# Patient Record
Sex: FEMALE | Race: BLACK OR AFRICAN AMERICAN | NOT HISPANIC OR LATINO | Employment: FULL TIME | URBAN - METROPOLITAN AREA
[De-identification: names, ages, dates, MRNs, and addresses within clinical notes are randomized per-mention and may not be internally consistent; named-entity substitution may affect disease eponyms.]

---

## 2023-09-14 ENCOUNTER — HOSPITAL ENCOUNTER (EMERGENCY)
Facility: HOSPITAL | Age: 53
Discharge: HOME | End: 2023-09-14
Attending: STUDENT IN AN ORGANIZED HEALTH CARE EDUCATION/TRAINING PROGRAM
Payer: COMMERCIAL

## 2023-09-14 VITALS
DIASTOLIC BLOOD PRESSURE: 97 MMHG | WEIGHT: 164 LBS | HEIGHT: 61 IN | SYSTOLIC BLOOD PRESSURE: 143 MMHG | RESPIRATION RATE: 16 BRPM | BODY MASS INDEX: 30.96 KG/M2 | OXYGEN SATURATION: 96 % | TEMPERATURE: 98 F | HEART RATE: 70 BPM

## 2023-09-14 DIAGNOSIS — S60.042A CONTUSION OF LEFT RING FINGER WITHOUT DAMAGE TO NAIL, INITIAL ENCOUNTER: Primary | ICD-10-CM

## 2023-09-14 DIAGNOSIS — S61.215A LACERATION OF LEFT RING FINGER WITHOUT FOREIGN BODY WITHOUT DAMAGE TO NAIL, INITIAL ENCOUNTER: ICD-10-CM

## 2023-09-14 PROCEDURE — 99283 EMERGENCY DEPT VISIT LOW MDM: CPT

## 2023-09-14 NOTE — ED PROVIDER NOTES
Encounter Date: 9/14/2023       History     Chief Complaint   Patient presents with    finger pain     POV, ambulatory, GCS 15. Reports Tuesday night was putting up large box onto shelf and it slipped on her left hand, cutting left distal 4th digit. Reports she has had it wrapped with bandaid and had some bleeding since occurred. No active bleeding at this time. Denies crushing injury.     53 y.o. female presents to the ED with left 4th digit pain secondary to smashing it with metal box at work on Tuesday.  Patient notes she initially had a laceration however states she wrapped it and a Band-Aid and states the bleeding subsided.  States she was upstairs working when they told her to come get the finger evaluated.  Notes pain to the distal edge.    The history is provided by the patient. No  was used.     Review of patient's allergies indicates:  No Known Allergies  No past medical history on file.  No past surgical history on file.  No family history on file.     Review of Systems   Constitutional:  Negative for chills and fever.   Eyes:  Negative for visual disturbance.   Respiratory:  Negative for cough and shortness of breath.    Cardiovascular:  Negative for chest pain.   Gastrointestinal:  Negative for abdominal pain, nausea and vomiting.   Genitourinary:  Negative for dysuria.   Musculoskeletal:  Negative for arthralgias.   Skin:  Positive for wound. Negative for color change and rash.   Neurological:  Negative for dizziness and headaches.   Psychiatric/Behavioral:  Negative for behavioral problems.    All other systems reviewed and are negative.      Physical Exam     Initial Vitals [09/14/23 1615]   BP Pulse Resp Temp SpO2   (!) 143/97 70 16 98 °F (36.7 °C) 96 %      MAP       --         Physical Exam    Nursing note and vitals reviewed.  Constitutional: She appears well-developed and well-nourished.   HENT:   Head: Normocephalic and atraumatic.   Eyes: EOM are normal. Pupils are equal,  round, and reactive to light.   Neck: Neck supple.   Cardiovascular:  Normal rate, regular rhythm and normal heart sounds.           Pulmonary/Chest: Breath sounds normal.   Abdominal: Abdomen is soft. Bowel sounds are normal.   Musculoskeletal:         General: Normal range of motion.        Hands:       Cervical back: Neck supple.     Neurological: She is alert and oriented to person, place, and time. She has normal strength. GCS score is 15. GCS eye subscore is 4. GCS verbal subscore is 5. GCS motor subscore is 6.   Skin: Skin is warm and dry.   Psychiatric: She has a normal mood and affect.         ED Course   Procedures  Labs Reviewed - No data to display       Imaging Results              X-Ray Finger 2 or More Views Left (Final result)  Result time 09/14/23 16:40:04      Final result by Joanna Montanez MD (09/14/23 16:40:04)                   Impression:      No acute bony abnormality.      Electronically signed by: Joanna Montanez  Date:    09/14/2023  Time:    16:40               Narrative:    EXAMINATION:  XR FINGER 2 OR MORE VIEWS LEFT    CLINICAL HISTORY:  laceration;    COMPARISON:  None.    FINDINGS:  There is no acute fracture or malalignment.  The soft tissues are unremarkable.                                       Medications - No data to display  Medical Decision Making  Differential diagnosis:  laceration, contusion, fracture    54 y/o F presents to the ED with left fourth digit laceration and pain s/t injury on Tuesday while at work. Well-approximated laceration to distal fourth digit, mild tenderness. No fracture on XR. Will d/c home with wound care     Amount and/or Complexity of Data Reviewed  Radiology: ordered.                               Clinical Impression:   Final diagnoses:  [S60.042A] Contusion of left ring finger without damage to nail, initial encounter (Primary)  [S61.215A] Laceration of left ring finger without foreign body without damage to nail, initial encounter        ED  Disposition Condition    Discharge Stable          ED Prescriptions    None       Follow-up Information       Follow up With Specialties Details Why Contact Info    [facility] - Emergency Dept Emergency Medicine In 1 week If symptoms worsen [address] [facility] [id number] [phone]    Primary care provider  In 2 days As needed              Koko Lai PA-C  09/14/23 1739

## 2024-02-21 ENCOUNTER — HOSPITAL ENCOUNTER (OUTPATIENT)
Dept: RADIOLOGY | Facility: HOSPITAL | Age: 54
Discharge: HOME | End: 2024-02-21
Attending: NURSE PRACTITIONER
Payer: COMMERCIAL

## 2024-02-21 VITALS — WEIGHT: 180 LBS | BODY MASS INDEX: 33.13 KG/M2 | HEIGHT: 62 IN

## 2024-02-21 DIAGNOSIS — N64.52 NIPPLE DISCHARGE: ICD-10-CM

## 2024-02-22 ENCOUNTER — HOSPITAL ENCOUNTER (OUTPATIENT)
Dept: RADIOLOGY | Facility: HOSPITAL | Age: 54
Discharge: HOME | End: 2024-02-22
Attending: NURSE PRACTITIONER
Payer: COMMERCIAL

## 2024-02-22 DIAGNOSIS — N64.52 NIPPLE DISCHARGE: ICD-10-CM

## 2024-02-22 PROCEDURE — 77066 DX MAMMO INCL CAD BI: CPT | Mod: 26,,, | Performed by: STUDENT IN AN ORGANIZED HEALTH CARE EDUCATION/TRAINING PROGRAM

## 2024-02-22 PROCEDURE — 76642 ULTRASOUND BREAST LIMITED: CPT | Mod: 26,50,, | Performed by: STUDENT IN AN ORGANIZED HEALTH CARE EDUCATION/TRAINING PROGRAM

## 2024-02-22 PROCEDURE — 76642 ULTRASOUND BREAST LIMITED: CPT | Mod: TC,50

## 2024-02-22 PROCEDURE — 77066 DX MAMMO INCL CAD BI: CPT | Mod: TC

## 2024-02-22 PROCEDURE — 77062 BREAST TOMOSYNTHESIS BI: CPT | Mod: 26,,, | Performed by: STUDENT IN AN ORGANIZED HEALTH CARE EDUCATION/TRAINING PROGRAM

## 2024-03-05 ENCOUNTER — HOSPITAL ENCOUNTER (OUTPATIENT)
Dept: RADIOLOGY | Facility: HOSPITAL | Age: 54
Discharge: HOME | End: 2024-03-05
Attending: NURSE PRACTITIONER
Payer: COMMERCIAL

## 2024-03-05 DIAGNOSIS — R92.8 ABNORMAL FINDINGS ON DIAGNOSTIC IMAGING OF BREAST: ICD-10-CM

## 2024-03-05 DIAGNOSIS — R92.8 ABNORMAL FINDINGS ON DIAGNOSTIC IMAGING OF BREAST: Primary | ICD-10-CM

## 2024-03-05 PROCEDURE — 27000550 US BREAST BIOPSY WITH IMAGING 1ST SITE LEFT

## 2024-03-05 PROCEDURE — 19084 BX BREAST ADD LESION US IMAG: CPT | Mod: RT,,, | Performed by: RADIOLOGY

## 2024-03-05 PROCEDURE — 19083 BX BREAST 1ST LESION US IMAG: CPT | Mod: LT,,, | Performed by: RADIOLOGY

## 2024-03-05 PROCEDURE — 19084 BX BREAST ADD LESION US IMAG: CPT

## 2024-03-05 PROCEDURE — 77066 DX MAMMO INCL CAD BI: CPT | Mod: 26,,, | Performed by: RADIOLOGY

## 2024-03-05 PROCEDURE — 77062 BREAST TOMOSYNTHESIS BI: CPT | Mod: 26,,, | Performed by: RADIOLOGY

## 2024-03-05 PROCEDURE — 77066 DX MAMMO INCL CAD BI: CPT | Mod: TC

## 2024-03-08 DIAGNOSIS — D24.1 INTRADUCTAL PAPILLOMA OF BOTH BREASTS: Primary | ICD-10-CM

## 2024-03-08 DIAGNOSIS — D24.2 INTRADUCTAL PAPILLOMA OF BOTH BREASTS: Primary | ICD-10-CM

## 2024-03-08 LAB — PSYCHE PATHOLOGY RESULT: NORMAL

## 2024-03-14 ENCOUNTER — HOSPITAL ENCOUNTER (EMERGENCY)
Facility: HOSPITAL | Age: 54
Discharge: HOME | End: 2024-03-14
Attending: EMERGENCY MEDICINE
Payer: COMMERCIAL

## 2024-03-14 VITALS
HEART RATE: 66 BPM | OXYGEN SATURATION: 96 % | RESPIRATION RATE: 18 BRPM | DIASTOLIC BLOOD PRESSURE: 93 MMHG | BODY MASS INDEX: 29.81 KG/M2 | TEMPERATURE: 98 F | HEIGHT: 62 IN | WEIGHT: 162 LBS | SYSTOLIC BLOOD PRESSURE: 168 MMHG

## 2024-03-14 DIAGNOSIS — S46.912A SHOULDER STRAIN, LEFT, INITIAL ENCOUNTER: Primary | ICD-10-CM

## 2024-03-14 PROCEDURE — 99284 EMERGENCY DEPT VISIT MOD MDM: CPT | Mod: 25

## 2024-03-14 PROCEDURE — 63600175 PHARM REV CODE 636 W HCPCS: Performed by: EMERGENCY MEDICINE

## 2024-03-14 PROCEDURE — 96372 THER/PROPH/DIAG INJ SC/IM: CPT | Performed by: EMERGENCY MEDICINE

## 2024-03-14 RX ORDER — INDOMETHACIN 50 MG/1
50 CAPSULE ORAL
Qty: 15 CAPSULE | Refills: 0 | Status: SHIPPED | OUTPATIENT
Start: 2024-03-14

## 2024-03-14 RX ORDER — DEXAMETHASONE SODIUM PHOSPHATE 4 MG/ML
6 INJECTION, SOLUTION INTRA-ARTICULAR; INTRALESIONAL; INTRAMUSCULAR; INTRAVENOUS; SOFT TISSUE
Status: COMPLETED | OUTPATIENT
Start: 2024-03-14 | End: 2024-03-14

## 2024-03-14 RX ORDER — KETOROLAC TROMETHAMINE 30 MG/ML
60 INJECTION, SOLUTION INTRAMUSCULAR; INTRAVENOUS
Status: COMPLETED | OUTPATIENT
Start: 2024-03-14 | End: 2024-03-14

## 2024-03-14 RX ADMIN — DEXAMETHASONE SODIUM PHOSPHATE 6 MG: 4 INJECTION, SOLUTION INTRA-ARTICULAR; INTRALESIONAL; INTRAMUSCULAR; INTRAVENOUS; SOFT TISSUE at 07:03

## 2024-03-14 RX ADMIN — KETOROLAC TROMETHAMINE 60 MG: 30 INJECTION, SOLUTION INTRAMUSCULAR at 07:03

## 2024-03-14 NOTE — ED TRIAGE NOTES
"Pt c/o L shoulder pain since yesterday. Pt reports she was lifting a heavy piece of equipment over her head and felt a "pop" in her shoulder. States pain worsened this AM. Swelling and tenderness noted to L shoulder. Pt able to perform full ROM.  "

## 2024-03-14 NOTE — ED PROVIDER NOTES
"Encounter Date: 3/14/2024    SCRIBE #1 NOTE: I, Brunilda Beaver, am scribing for, and in the presence of,  Kristofer Gong III, MD. I have scribed the following portions of the note - Other sections scribed: HPI, ROS, PE.       History     Chief Complaint   Patient presents with    Shoulder Pain     Pt c/o L shoulder pain since yesterday. Pt reports she was lifting a heavy piece of equipment over her head and felt a "pop" in her shoulder. States pain worsened this AM. Swelling and tenderness noted to L shoulder. Pt able to perform full ROM.     54 year old female presents to the ED for left shoulder pain and swelling that began on Tuesday 3/12/24 when she was lifting a heavy piece of equipment over her head and felt a "pop." Patient reports that she went to work yesterday and was able to manage, however her pain is worsening. She describes it as a throbbing pain. She denies taking and OTC medication at home. She is right handed. She denies having any allergies.     The history is provided by the patient. No  was used.   Shoulder Pain  This is a new problem. The current episode started 2 days ago. The problem occurs constantly. The problem has been gradually worsening. Pertinent negatives include no chest pain, no abdominal pain, no headaches and no shortness of breath. Exacerbated by: movement. Nothing relieves the symptoms. She has tried nothing for the symptoms.     Review of patient's allergies indicates:  No Known Allergies  No past medical history on file.  No past surgical history on file.  Family History   Problem Relation Age of Onset    Breast cancer Paternal Grandmother 80        Review of Systems   Constitutional:  Negative for chills, fatigue and fever.   HENT:  Negative for congestion and sore throat.    Eyes:  Negative for visual disturbance.   Respiratory:  Negative for cough and shortness of breath.    Cardiovascular:  Negative for chest pain.   Gastrointestinal:  Negative for " abdominal pain, diarrhea, nausea and vomiting.   Genitourinary:  Negative for dysuria.   Musculoskeletal:         Left shoulder pain and swelling    Skin:  Negative for rash.   Neurological:  Negative for weakness, numbness and headaches.   All other systems reviewed and are negative.      Physical Exam     Initial Vitals [03/14/24 0334]   BP Pulse Resp Temp SpO2   (!) 160/90 74 18 97.9 °F (36.6 °C) 97 %      MAP       --         Physical Exam    Nursing note and vitals reviewed.  Constitutional: She appears well-developed and well-nourished. No distress.   HENT:   Head: Normocephalic and atraumatic.   Mouth/Throat: Oropharynx is clear and moist.   Eyes: EOM are normal. Pupils are equal, round, and reactive to light.   Neck: Trachea normal. Neck supple.   Normal range of motion.  Cardiovascular:  Normal rate and regular rhythm.           No murmur heard.  Pulmonary/Chest: Breath sounds normal. No respiratory distress.   Abdominal: Abdomen is soft. Bowel sounds are normal. She exhibits no distension. There is no abdominal tenderness.   Musculoskeletal:         General: Normal range of motion.      Cervical back: Normal range of motion and neck supple.      Lumbar back: Normal range of motion.      Comments: Mild tenderness to the left anterior deltoid, neurovascularly intact      Neurological: She is alert and oriented to person, place, and time. She has normal strength. No cranial nerve deficit. GCS score is 15. GCS eye subscore is 4. GCS verbal subscore is 5. GCS motor subscore is 6.   Skin: Skin is warm and dry. No rash noted.   Psychiatric: She has a normal mood and affect. Her behavior is normal. Judgment and thought content normal.         ED Course   Procedures  Labs Reviewed - No data to display       Imaging Results              X-Ray Shoulder 2 or More Views Left (Final result)  Result time 03/14/24 06:56:34      Final result by George Bautista MD (03/14/24 06:56:34)                   Impression:      No  acute fracture.  Moderate degenerative change with tendinous calcifications noted throughout.      Electronically signed by: George Bautista  Date:    03/14/2024  Time:    06:56               Narrative:    EXAMINATION:  XR SHOULDER COMPLETE 2 OR MORE VIEWS LEFT    CLINICAL HISTORY:  trauma;    TECHNIQUE:  Two or three views of the left shoulder were performed.    COMPARISON:  None    FINDINGS:  Degenerative changes with acromioclavicular osteophytes.  No displaced fracture.  Tendinous calcifications are noted throughout.  There is increase of the acromial humeral interval.                                       Medications   ketorolac injection 60 mg (60 mg Intramuscular Given 3/14/24 0715)   dexAMETHasone injection 6 mg (6 mg Intramuscular Given 3/14/24 0715)     Medical Decision Making  The differential diagnosis includes, but is not limited to, avulsion, fracture, sprain     Patient x-ray without abnormality will start on anti-inflammatories neurologically intact she will follow up with his primary care    Problems Addressed:  Shoulder strain, left, initial encounter: self-limited or minor problem    Amount and/or Complexity of Data Reviewed  Radiology: ordered.    Risk  Prescription drug management.            Scribe Attestation:   Scribe #1: I performed the above scribed service and the documentation accurately describes the services I performed. I attest to the accuracy of the note.    Attending Attestation:           Physician Attestation for Scribe:  Physician Attestation Statement for Scribe #1: I, Kristofer Gong III, MD, reviewed documentation, as scribed by Brunilda Beaver in my presence, and it is both accurate and complete.                                    Clinical Impression:  Final diagnoses:  [S46.912A] Shoulder strain, left, initial encounter (Primary)          ED Disposition Condition    Discharge Stable          ED Prescriptions       Medication Sig Dispense Start Date End Date Auth. Provider     indomethacin (INDOCIN) 50 MG capsule Take 1 capsule (50 mg total) by mouth 3 (three) times daily with meals. 15 capsule 3/14/2024 -- Kristofer Gong III, MD          Follow-up Information       Follow up With Specialties Details Why Contact Info    Bobbi Sierra MD Family Medicine In 3 days  [address]  [facility] [address]  [phone]      Nitin Prince O, DO Orthopedic Surgery In 2 weeks  [address] [facility] [address]  [facility] [address]  [phone]               Kristofer Gong III, MD  03/14/24 0779

## 2024-03-14 NOTE — Clinical Note
"Katherin "Katherinrajni Johnson was seen and treated in our emergency department on 3/14/2024.  She may return with limitations on 03/14/2024.  No lifting over 5 lb with left arm for 3 days     Sincerely,      Kristofer Gong III, MD    "

## 2024-03-25 NOTE — H&P (VIEW-ONLY)
[facility]  Breast Surgical Oncology  New Patient Office Visit - H&P      Referring Provider: Bobbi Sierra   PCP: Bobbi Sierra MD     Chief Complaint:   Chief Complaint   Patient presents with    Biopsy     Patient c/o intermittent bilateral tingling sensation around the nipples x 3 week tender touch, no redness, no nipple discharge         Subjective:       HPI:  Katherin Johnson is a 54 y.o. female who presents on 3/26/2024 for evaluation of  newly diagnosed bilateral intraductal papillomas .     A detailed patient history was obtained and reviewed. She reports right nipple discharge which prompted her imaging work-up. She describes the discharge as yellow-clear. She did not notice any lumps/masses, skin changes, or other findings.    MG breast density: Category B (scattered areas of fibroglandular tissue)     Imagin2024  BL DG MG/BL US BREAST LIMITED at [facility]-  IMPRESSION: BENIGN, ULTRASOUND SUSPICIOUS OF MALIGNANCY   A) R breast 5 o'clock subareolar 0.7 x 0.3 x 0.8 cm oval hypoechoic circumscribed vascular intraductal mass within a mildly dilated ducts correlates with the patient's history of right nipple discharge and is suspicious.  BI-RADS 4: Suspicious.    B) L breast 3 o'clock  2 cm FN 0.6 x 0.4 x 0.5 cm oval nonparallel hypoechoic circumscribed vascular mass was incidentally discovered and is suspicious.  BI-RADS 4: Suspicious.  C) No mammographic evidence of malignancy in either breast.    Pathology:  3/5/2024  Ultrasound-guided Core Needle Biopsy Left Breast 3 o'clock 2 cm FN-  -Sclerosing intraductal papilloma(measuring 3.5 mm) with superimposed organizing hemorrhage  -Focal moderate to florid ductal epithelial hyperplasia of the usual type    3/5/2024  Ultrasound-guided Core Needle Biopsy Right Breast 5 o'clock SA-  -Sclerosing intraductal papilloma (measuring 2.5 mm)  - No evidence of malignancy    OB/GYN History:  Age at Menarche Onset:  16  Menopausal Status: postmenopausal, LMP: No LMP recorded.  Hysterectomy/Oophorectomy: hysterectomy with BSO, at age 46  Hormonal birth control (duration): Yes OCP (estrogen/progesterone).   Pregnancy History:   Age at first live birth: 17  Hormone Replacement Therapy: Yes,  Premarin 0.9 mg (started at age 46 still taking)  Patient did not breast feed.  Patient admits to nipple discharge in right breast.   Patient admits to to previous breast biopsy.( removal of a cyst)  Patient denies to a personal history of breast cancer.    Other Relevant History:  Prior thoracic RT: none  Genetic testing: No  Ashkenazi Evangelical descent: No    Family History:  Family History   Problem Relation Age of Onset    Breast cancer Paternal Grandmother 80    Heart failure Mother         Triple bypass    Heart disease Mother     Cancer Father         Throat cancer        Past History:  Past Medical History:   Diagnosis Date    Anxiety     Insomnia         Past Surgical History:   Procedure Laterality Date     SECTION      I have 4 :  ,,,    COSMETIC SURGERY      Had a tummy tuck    HYSTERECTOMY      Had an hysterectomy in that year    OOPHORECTOMY  2016    When i bailee my hysterectomy    SPINE SURGERY  2012    (Neck)Dr.Jason Mckeon        Social History     Socioeconomic History    Marital status:    Tobacco Use    Smoking status: Never    Smokeless tobacco: Never    Tobacco comments:     N/A   Substance and Sexual Activity    Alcohol use: Never    Drug use: Never    Sexual activity: Not Currently     Partners: Male     Birth control/protection: None        Body mass index is 32.74 kg/m².     Allergy/Medications:   Review of patient's allergies indicates:  No Known Allergies       Current Outpatient Medications:     dextroamphetamine-amphetamine (ADDERALL) 20 mg tablet, Take 1 tablet by mouth 2 (two) times daily., Disp: , Rfl:     ergocalciferol (ERGOCALCIFEROL) 50,000 unit Cap,  "Take 50,000 Units by mouth every 7 days., Disp: , Rfl:     EScitalopram oxalate (LEXAPRO) 20 MG tablet, Take 20 mg by mouth every evening., Disp: , Rfl:     FLUZONE QUAD 9092-7402, PF, 60 mcg (15 mcg x 4)/0.5 mL Syrg, , Disp: , Rfl:     indomethacin (INDOCIN) 50 MG capsule, Take 1 capsule (50 mg total) by mouth 3 (three) times daily with meals., Disp: 15 capsule, Rfl: 0    PREMARIN 0.9 mg Tab, Take 0.9 mg by mouth., Disp: , Rfl:     zolpidem (AMBIEN) 10 mg Tab, Take 10 mg by mouth every evening., Disp: , Rfl:        Review of Systems:  Review of Systems   Constitutional:  Negative for chills, fever, malaise/fatigue and weight loss.   HENT:  Negative for congestion and sore throat.    Eyes:  Negative for blurred vision and double vision.   Respiratory:  Negative for cough, shortness of breath and wheezing.    Cardiovascular:  Negative for chest pain, palpitations and leg swelling.   Gastrointestinal:  Negative for abdominal pain, blood in stool, constipation, diarrhea, heartburn, nausea and vomiting.   Genitourinary:  Negative for dysuria, flank pain, frequency, hematuria and urgency.   Musculoskeletal:  Negative for back pain, joint pain and myalgias.   Skin:  Negative for rash.   Neurological:  Negative for dizziness, tingling, sensory change, weakness and headaches.   Endo/Heme/Allergies:  Does not bruise/bleed easily.   Psychiatric/Behavioral:  Negative for depression. The patient is not nervous/anxious and does not have insomnia.           Objective:     Vitals:  Blood pressure 133/85, pulse 66, temperature 97.8 °F (36.6 °C), temperature source Oral, resp. rate 18, height 5' 2" (1.575 m), weight 81.2 kg (179 lb), SpO2 95 %.      Physical Exam:  General: The patient is awake, alert and oriented times three. The patient is well nourished and in no acute distress.   Neck: There is no evidence of palpable cervical, supraclavicular or axillary adenopathy. The neck is supple. The thyroid is not enlarged. "   Musculoskeletal: The patient has a normal range of motion of her bilateral upper extremities.   Chest: Examination of the chest wall fails to reveal any obvious abnormalities. The lungs are clear to auscultation bilaterally without rales, rhonchi, or wheezing.   Cardiovascular: The heart has a regular rate and rhythm without murmurs, gallops or rubs.  Breast:  Right: Examination of right breast reveals palpable nodule located beneath the NAC which is consistent with biopsy site and likely resolving hematoma. The nipple inverts with palpation and has a small amount of yellow clear discharge. There is no skin dimpling with movement of the pectoralis. There are no significant skin changes overlying the breast.   Left: Examination of the left breast fails to reveal any dominant masses or areas of significant focal nodularity. The nipple inverts with palpation without evidence of discharge. There is no skin dimpling with movement of the pectoralis. There are no significant skin changes overlying the breast.  Abdomen: The abdomen is soft, flat, nontender and nondistended with no palpable masses or organomegaly.  Integumentary: no rashes or skin lesions present  Neurologic: cranial nerves intact, no signs of peripheral neurological deficit, motor/sensory function intact    Assessment and Discussion:       Encounter Diagnoses   Name Primary?    Intraductal papilloma of both breasts Yes    Discharge from right nipple       Intraductal papillomas consist of a monotonous array of papillary cells that grow from the wall of a cyst into its lumen. Although they are not concerning in and of themselves, they can harbor areas of atypia or ductal carcinoma in situ (DCIS). Papillomas can occur as solitary or multiple lesions.    Solitary lesions -- Solitary intraductal papillomas may be identified as a mass on mammography, ultrasound, magnetic resonance imaging (MRI), or ductography, or they can be found incidentally. Nipple  discharge, particularly bloody nipple discharge, is a frequent clinical presentation.     When a core biopsy demonstrates a papilloma with atypical cells, surgical excision is warranted. Papillary lesions with atypia are upgraded pathologically up to 67 percent of the time. After excision, if there is no upgrading beyond atypia, a discussion about endocrine therapy for breast cancer prevention is indicated.    The data surrounding solitary papillomas without evidence of atypia are less clear. Reported rates of upgrade of pure papillary lesions to atypia or malignancy are highly variable, historically ranging from 5 to 20 percent, but trending down to less than 10 percent in the last decade.      The current American Society of Breast Surgeons guidelines suggest individualizing the decision to excise a papilloma based on such criteria as size, symptoms (eg, palpability or nipple discharge), and breast cancer risk factors. Excision is recommended in cases of atypia, a palpable mass lesion, bloody nipple discharge (primarily for symptomatic relief), and/or pathology-imaging discordance, whereas small incidental benign solitary papillomas without atypia and with imaging concordance may be offered close clinical and imaging follow-up.          Plan:     Problem List Items Addressed This Visit          Renal/    Intraductal papilloma of both breasts - Primary    Current Assessment & Plan     Surgery - Bilateral excisional breast biopsy with seed localization  Tentative Date: 4/22/2024  Preop - BMP, CBC  Surgical instruction and information were discussed in detail         Relevant Orders    Case Request Operating Room: EXCISION,MASS,BREAST,USING RADIOLOGICAL MARKER - [facility] Localization - BILATERAL (Completed)    Basic metabolic panel    CBC auto differential    EKG 12-lead    Discharge from right nipple    Relevant Orders    Case Request Operating Room: EXCISION,MASS,BREAST,USING RADIOLOGICAL MARKER - [facility]  Localization - BILATERAL (Completed)    Basic metabolic panel    CBC auto differential    EKG 12-lead             All of questions were answered    Myranda Breaux MD  Breast Surgical Oncology     OFFICE VISIT CODING:    Non-face-to-face time included:  Yes Preparing to see the patient such as reviewing the patient record  Yes Obtaining and reviewing separately obtained history  Yes Independently interpreting results  Yes Documenting clinical information in electronic health record  No Ordering appropriate medications  Yes Ordering appropriate tests  Yes Ordering appropriate procedures (including follow-up)  Yes Referring and communicating with other health care professionals (not separately reported)  Yes Care Coordination (not separately reported)    Face-to-face time included:  Yes Performing a medically necessary appropriate history, examination, and/or evaluation  Yes Communicating results to the patient/family/caregiver  Yes Counseling and educating the patient/family/caregiver  Yes Answering patient/family/caregiver questions    Total Time: 50 minutes    Total time includes both face-to-face and non-face-to-face time personally spent by myself on the day of the visit.

## 2024-03-25 NOTE — PROGRESS NOTES
[facility] - Breast [place] [facility]  Breast Surgical Oncology  New Patient Office Visit - H&P      Referring Provider: Bobbi Sierra   PCP: Bobbi Sierra MD     Chief Complaint:   Chief Complaint   Patient presents with    Biopsy     Patient c/o intermittent bilateral tingling sensation around the nipples x 3 week tender touch, no redness, no nipple discharge         Subjective:       HPI:  Katherin Johnson is a 54 y.o. female who presents on 3/26/2024 for evaluation of  newly diagnosed bilateral intraductal papillomas .     A detailed patient history was obtained and reviewed. She reports right nipple discharge which prompted her imaging work-up. She describes the discharge as yellow-clear. She did not notice any lumps/masses, skin changes, or other findings.    MG breast density: Category B (scattered areas of fibroglandular tissue)     [id number]  BL DG MG/BL US BREAST LIMITED at [facility]-  IMPRESSION: BENIGN, ULTRASOUND SUSPICIOUS OF MALIGNANCY   A) R breast 5 o'clock subareolar 0.7 x 0.3 x 0.8 cm oval hypoechoic circumscribed vascular intraductal mass within a mildly dilated ducts correlates with the patient's history of right nipple discharge and is suspicious.  BI-RADS 4: Suspicious.    B) L breast 3 o'clock  2 cm FN 0.6 x 0.4 x 0.5 cm oval nonparallel hypoechoic circumscribed vascular mass was incidentally discovered and is suspicious.  BI-RADS 4: Suspicious.  C) No mammographic evidence of malignancy in either breast.    Pathology:  3/5/2024  Ultrasound-guided Core Needle Biopsy Left Breast 3 o'clock 2 cm FN-  -Sclerosing intraductal papilloma(measuring 3.5 mm) with superimposed organizing hemorrhage  -Focal moderate to florid ductal epithelial hyperplasia of the usual type    3/5/2024  Ultrasound-guided Core Needle Biopsy Right Breast 5 o'clock SA-  -Sclerosing intraductal papilloma (measuring 2.5 mm)  - No evidence of malignancy    OB/GYN History:  Age at Menarche Onset:  16  Menopausal Status: postmenopausal, LMP: No LMP recorded.  Hysterectomy/Oophorectomy: hysterectomy with BSO, at age 46  Hormonal birth control (duration): Yes OCP (estrogen/progesterone).   Pregnancy History:   Age at first live birth: 17  Hormone Replacement Therapy: Yes,  Premarin 0.9 mg (started at age 46 still taking)  Patient did not breast feed.  Patient admits to nipple discharge in right breast.   Patient admits to to previous breast biopsy.( removal of a cyst)  Patient denies to a personal history of breast cancer.    Other Relevant History:  Prior thoracic RT: none  Genetic testing: No  Ashkenazi Episcopal descent: No    Family History:  Family History   Problem Relation Age of Onset    Breast cancer Paternal Grandmother 80    Heart failure Mother         Triple bypass    Heart disease Mother     Cancer Father         Throat cancer        Past History:  Past Medical History:   Diagnosis Date    Anxiety     Insomnia         Past Surgical History:   Procedure Laterality Date     SECTION      I have 4 :  ,,,    COSMETIC SURGERY      Had a tummy tuck    HYSTERECTOMY      Had an hysterectomy in that year    OOPHORECTOMY  2016    When i bailee my hysterectomy    SPINE SURGERY  2012    (Neck)Dr.Jason Mckeon        Social History     Socioeconomic History    Marital status:    Tobacco Use    Smoking status: Never    Smokeless tobacco: Never    Tobacco comments:     N/A   Substance and Sexual Activity    Alcohol use: Never    Drug use: Never    Sexual activity: Not Currently     Partners: Male     Birth control/protection: None        Body mass index is 32.74 kg/m².     Allergy/Medications:   Review of patient's allergies indicates:  No Known Allergies       Current Outpatient Medications:     dextroamphetamine-amphetamine (ADDERALL) 20 mg tablet, Take 1 tablet by mouth 2 (two) times daily., Disp: , Rfl:     ergocalciferol (ERGOCALCIFEROL) 50,000 unit Cap,  "Take 50,000 Units by mouth every 7 days., Disp: , Rfl:     EScitalopram oxalate (LEXAPRO) 20 MG tablet, Take 20 mg by mouth every evening., Disp: , Rfl:     FLUZONE QUAD 7070-9872, PF, 60 mcg (15 mcg x 4)/0.5 mL Syrg, , Disp: , Rfl:     indomethacin (INDOCIN) 50 MG capsule, Take 1 capsule (50 mg total) by mouth 3 (three) times daily with meals., Disp: 15 capsule, Rfl: 0    PREMARIN 0.9 mg Tab, Take 0.9 mg by mouth., Disp: , Rfl:     zolpidem (AMBIEN) 10 mg Tab, Take 10 mg by mouth every evening., Disp: , Rfl:        Review of Systems:  Review of Systems   Constitutional:  Negative for chills, fever, malaise/fatigue and weight loss.   HENT:  Negative for congestion and sore throat.    Eyes:  Negative for blurred vision and double vision.   Respiratory:  Negative for cough, shortness of breath and wheezing.    Cardiovascular:  Negative for chest pain, palpitations and leg swelling.   Gastrointestinal:  Negative for abdominal pain, blood in stool, constipation, diarrhea, heartburn, nausea and vomiting.   Genitourinary:  Negative for dysuria, flank pain, frequency, hematuria and urgency.   Musculoskeletal:  Negative for back pain, joint pain and myalgias.   Skin:  Negative for rash.   Neurological:  Negative for dizziness, tingling, sensory change, weakness and headaches.   Endo/Heme/Allergies:  Does not bruise/bleed easily.   Psychiatric/Behavioral:  Negative for depression. The patient is not nervous/anxious and does not have insomnia.           Objective:     Vitals:  Blood pressure 133/85, pulse 66, temperature 97.8 °F (36.6 °C), temperature source Oral, resp. rate 18, height 5' 2" (1.575 m), weight 81.2 kg (179 lb), SpO2 95 %.      Physical Exam:  General: The patient is awake, alert and oriented times three. The patient is well nourished and in no acute distress.   Neck: There is no evidence of palpable cervical, supraclavicular or axillary adenopathy. The neck is supple. The thyroid is not enlarged. "   Musculoskeletal: The patient has a normal range of motion of her bilateral upper extremities.   Chest: Examination of the chest wall fails to reveal any obvious abnormalities. The lungs are clear to auscultation bilaterally without rales, rhonchi, or wheezing.   Cardiovascular: The heart has a regular rate and rhythm without murmurs, gallops or rubs.  Breast:  Right: Examination of right breast reveals palpable nodule located beneath the NAC which is consistent with biopsy site and likely resolving hematoma. The nipple inverts with palpation and has a small amount of yellow clear discharge. There is no skin dimpling with movement of the pectoralis. There are no significant skin changes overlying the breast.   Left: Examination of the left breast fails to reveal any dominant masses or areas of significant focal nodularity. The nipple inverts with palpation without evidence of discharge. There is no skin dimpling with movement of the pectoralis. There are no significant skin changes overlying the breast.  Abdomen: The abdomen is soft, flat, nontender and nondistended with no palpable masses or organomegaly.  Integumentary: no rashes or skin lesions present  Neurologic: cranial nerves intact, no signs of peripheral neurological deficit, motor/sensory function intact    Assessment and Discussion:       Encounter Diagnoses   Name Primary?    Intraductal papilloma of both breasts Yes    Discharge from right nipple       Intraductal papillomas consist of a monotonous array of papillary cells that grow from the wall of a cyst into its lumen. Although they are not concerning in and of themselves, they can harbor areas of atypia or ductal carcinoma in situ (DCIS). Papillomas can occur as solitary or multiple lesions.    Solitary lesions -- Solitary intraductal papillomas may be identified as a mass on mammography, ultrasound, magnetic resonance imaging (MRI), or ductography, or they can be found incidentally. Nipple  discharge, particularly bloody nipple discharge, is a frequent clinical presentation.     When a core biopsy demonstrates a papilloma with atypical cells, surgical excision is warranted. Papillary lesions with atypia are upgraded pathologically up to 67 percent of the time. After excision, if there is no upgrading beyond atypia, a discussion about endocrine therapy for breast cancer prevention is indicated.    The data surrounding solitary papillomas without evidence of atypia are less clear. Reported rates of upgrade of pure papillary lesions to atypia or malignancy are highly variable, historically ranging from 5 to 20 percent, but trending down to less than 10 percent in the last decade.      The current American Society of Breast Surgeons guidelines suggest individualizing the decision to excise a papilloma based on such criteria as size, symptoms (eg, palpability or nipple discharge), and breast cancer risk factors. Excision is recommended in cases of atypia, a palpable mass lesion, bloody nipple discharge (primarily for symptomatic relief), and/or pathology-imaging discordance, whereas small incidental benign solitary papillomas without atypia and with imaging concordance may be offered close clinical and imaging follow-up.          Plan:     Problem List Items Addressed This Visit          Renal/    Intraductal papilloma of both breasts - Primary    Current Assessment & Plan     Surgery - Bilateral excisional breast biopsy with seed localization  Tentative Date: 4/22/2024  Preop - BMP, CBC  Surgical instruction and information were discussed in detail         Relevant Orders    Case Request Operating Room: EXCISION,MASS,BREAST,USING RADIOLOGICAL MARKER - Magseed Localization - BILATERAL (Completed)    Basic metabolic panel    CBC auto differential    EKG 12-lead    Discharge from right nipple    Relevant Orders    Case Request Operating Room: EXCISION,MASS,BREAST,USING RADIOLOGICAL MARKER - Magseed  Localization - BILATERAL (Completed)    Basic metabolic panel    CBC auto differential    EKG 12-lead             All of questions were answered    Myranda Breaux MD  Breast Surgical Oncology     OFFICE VISIT CODING:    Non-face-to-face time included:  Yes Preparing to see the patient such as reviewing the patient record  Yes Obtaining and reviewing separately obtained history  Yes Independently interpreting results  Yes Documenting clinical information in electronic health record  No Ordering appropriate medications  Yes Ordering appropriate tests  Yes Ordering appropriate procedures (including follow-up)  Yes Referring and communicating with other health care professionals (not separately reported)  Yes Care Coordination (not separately reported)    Face-to-face time included:  Yes Performing a medically necessary appropriate history, examination, and/or evaluation  Yes Communicating results to the patient/family/caregiver  Yes Counseling and educating the patient/family/caregiver  Yes Answering patient/family/caregiver questions    Total Time: 50 minutes    Total time includes both face-to-face and non-face-to-face time personally spent by myself on the day of the visit.

## 2024-03-26 ENCOUNTER — OFFICE VISIT (OUTPATIENT)
Dept: SURGERY | Facility: CLINIC | Age: 54
End: 2024-03-26
Payer: COMMERCIAL

## 2024-03-26 VITALS
BODY MASS INDEX: 32.94 KG/M2 | DIASTOLIC BLOOD PRESSURE: 85 MMHG | OXYGEN SATURATION: 95 % | WEIGHT: 179 LBS | RESPIRATION RATE: 18 BRPM | TEMPERATURE: 98 F | SYSTOLIC BLOOD PRESSURE: 133 MMHG | HEIGHT: 62 IN | HEART RATE: 66 BPM

## 2024-03-26 DIAGNOSIS — N64.52 DISCHARGE FROM RIGHT NIPPLE: ICD-10-CM

## 2024-03-26 DIAGNOSIS — D24.1 INTRADUCTAL PAPILLOMA OF BOTH BREASTS: Primary | ICD-10-CM

## 2024-03-26 DIAGNOSIS — D24.2 INTRADUCTAL PAPILLOMA OF BOTH BREASTS: Primary | ICD-10-CM

## 2024-03-26 PROCEDURE — 3079F DIAST BP 80-89 MM HG: CPT | Mod: CPTII,S$GLB,, | Performed by: SURGERY

## 2024-03-26 PROCEDURE — 1160F RVW MEDS BY RX/DR IN RCRD: CPT | Mod: CPTII,S$GLB,, | Performed by: SURGERY

## 2024-03-26 PROCEDURE — 99999 PR PBB SHADOW E&M-EST. PATIENT-LVL V: CPT | Mod: PBBFAC,,, | Performed by: SURGERY

## 2024-03-26 PROCEDURE — 3008F BODY MASS INDEX DOCD: CPT | Mod: CPTII,S$GLB,, | Performed by: SURGERY

## 2024-03-26 PROCEDURE — 1159F MED LIST DOCD IN RCRD: CPT | Mod: CPTII,S$GLB,, | Performed by: SURGERY

## 2024-03-26 PROCEDURE — 99204 OFFICE O/P NEW MOD 45 MIN: CPT | Mod: S$GLB,,, | Performed by: SURGERY

## 2024-03-26 PROCEDURE — 3075F SYST BP GE 130 - 139MM HG: CPT | Mod: CPTII,S$GLB,, | Performed by: SURGERY

## 2024-03-26 RX ORDER — ESTROGENS, CONJUGATED 0.9 MG/1
0.9 TABLET, FILM COATED ORAL DAILY
Status: ON HOLD | COMMUNITY
Start: 2024-03-20 | End: 2024-04-22 | Stop reason: HOSPADM

## 2024-03-26 RX ORDER — ZOLPIDEM TARTRATE 10 MG/1
10 TABLET ORAL NIGHTLY
COMMUNITY
Start: 2024-03-20

## 2024-03-26 RX ORDER — ERGOCALCIFEROL 1.25 MG/1
50000 CAPSULE ORAL
COMMUNITY
Start: 2024-01-12 | End: 2024-05-28

## 2024-03-26 RX ORDER — ESCITALOPRAM OXALATE 20 MG/1
20 TABLET ORAL NIGHTLY
COMMUNITY
Start: 2024-03-20

## 2024-03-26 RX ORDER — INFLUENZA A VIRUS A/VICTORIA/4897/2022 IVR-238 (H1N1) ANTIGEN (FORMALDEHYDE INACTIVATED), INFLUENZA A VIRUS A/DARWIN/9/2021 SAN-010 (H3N2) ANTIGEN (FORMALDEHYDE INACTIVATED), INFLUENZA B VIRUS B/PHUKET/3073/2013 ANTIGEN (FORMALDEHYDE INACTIVATED), AND INFLUENZA B VIRUS B/MICHIGAN/01/2021 ANTIGEN (FORMALDEHYDE INACTIVATED) 15; 15; 15; 15 UG/.5ML; UG/.5ML; UG/.5ML; UG/.5ML
INJECTION, SUSPENSION INTRAMUSCULAR
Status: ON HOLD | COMMUNITY
Start: 2023-12-01 | End: 2024-04-22

## 2024-03-26 RX ORDER — DEXTROAMPHETAMINE SACCHARATE, AMPHETAMINE ASPARTATE, DEXTROAMPHETAMINE SULFATE AND AMPHETAMINE SULFATE 5; 5; 5; 5 MG/1; MG/1; MG/1; MG/1
1 TABLET ORAL 2 TIMES DAILY
COMMUNITY
Start: 2024-03-14

## 2024-03-26 NOTE — ASSESSMENT & PLAN NOTE
Surgery - Bilateral excisional breast biopsy with seed localization  Tentative Date: 4/22/2024  Preop - BMP, CBC  Surgical instruction and information were discussed in detail

## 2024-03-28 PROBLEM — N64.52 DISCHARGE FROM RIGHT NIPPLE: Status: ACTIVE | Noted: 2024-03-28

## 2024-03-28 RX ORDER — SODIUM CHLORIDE, SODIUM LACTATE, POTASSIUM CHLORIDE, CALCIUM CHLORIDE 600; 310; 30; 20 MG/100ML; MG/100ML; MG/100ML; MG/100ML
INJECTION, SOLUTION INTRAVENOUS CONTINUOUS
Status: CANCELLED | OUTPATIENT
Start: 2024-03-28

## 2024-04-17 ENCOUNTER — LAB VISIT (OUTPATIENT)
Dept: LAB | Facility: HOSPITAL | Age: 54
End: 2024-04-17
Attending: SURGERY
Payer: COMMERCIAL

## 2024-04-17 ENCOUNTER — HOSPITAL ENCOUNTER (OUTPATIENT)
Dept: RADIOLOGY | Facility: HOSPITAL | Age: 54
Discharge: HOME | End: 2024-04-17
Attending: SURGERY
Payer: COMMERCIAL

## 2024-04-17 DIAGNOSIS — D24.2 INTRADUCTAL PAPILLOMA OF BREAST, LEFT: ICD-10-CM

## 2024-04-17 DIAGNOSIS — D24.1 INTRADUCTAL PAPILLOMA OF BOTH BREASTS: ICD-10-CM

## 2024-04-17 DIAGNOSIS — D24.1 INTRADUCTAL PAPILLOMA OF BREAST, RIGHT: ICD-10-CM

## 2024-04-17 DIAGNOSIS — N64.52 DISCHARGE FROM RIGHT NIPPLE: ICD-10-CM

## 2024-04-17 DIAGNOSIS — D24.2 INTRADUCTAL PAPILLOMA OF BOTH BREASTS: ICD-10-CM

## 2024-04-17 LAB
ANION GAP SERPL CALC-SCNC: 9 MEQ/L
BASOPHILS # BLD AUTO: 0.04 X10(3)/MCL
BASOPHILS NFR BLD AUTO: 0.7 %
BUN SERPL-MCNC: 11.4 MG/DL (ref 9.8–20.1)
CALCIUM SERPL-MCNC: 8.4 MG/DL (ref 8.4–10.2)
CHLORIDE SERPL-SCNC: 105 MMOL/L (ref 98–107)
CO2 SERPL-SCNC: 24 MMOL/L (ref 22–29)
CREAT SERPL-MCNC: 0.76 MG/DL (ref 0.55–1.02)
CREAT/UREA NIT SERPL: 15
EOSINOPHIL # BLD AUTO: 0.12 X10(3)/MCL (ref 0–0.9)
EOSINOPHIL NFR BLD AUTO: 2.1 %
ERYTHROCYTE [DISTWIDTH] IN BLOOD BY AUTOMATED COUNT: 13.3 % (ref 11.5–17)
GFR SERPLBLD CREATININE-BSD FMLA CKD-EPI: >60 MLS/MIN/1.73/M2
GLUCOSE SERPL-MCNC: 125 MG/DL (ref 74–100)
HCT VFR BLD AUTO: 42.1 % (ref 37–47)
HGB BLD-MCNC: 13.6 G/DL (ref 12–16)
IMM GRANULOCYTES # BLD AUTO: 0.01 X10(3)/MCL (ref 0–0.04)
IMM GRANULOCYTES NFR BLD AUTO: 0.2 %
LYMPHOCYTES # BLD AUTO: 2.14 X10(3)/MCL (ref 0.6–4.6)
LYMPHOCYTES NFR BLD AUTO: 37.3 %
MCH RBC QN AUTO: 28.2 PG (ref 27–31)
MCHC RBC AUTO-ENTMCNC: 32.3 G/DL (ref 33–36)
MCV RBC AUTO: 87.2 FL (ref 80–94)
MONOCYTES # BLD AUTO: 0.53 X10(3)/MCL (ref 0.1–1.3)
MONOCYTES NFR BLD AUTO: 9.2 %
NEUTROPHILS # BLD AUTO: 2.9 X10(3)/MCL (ref 2.1–9.2)
NEUTROPHILS NFR BLD AUTO: 50.5 %
NRBC BLD AUTO-RTO: 0 %
OHS QRS DURATION: 80 MS
OHS QTC CALCULATION: 471 MS
PLATELET # BLD AUTO: 230 X10(3)/MCL (ref 130–400)
PMV BLD AUTO: 10.3 FL (ref 7.4–10.4)
POTASSIUM SERPL-SCNC: 3.6 MMOL/L (ref 3.5–5.1)
RBC # BLD AUTO: 4.83 X10(6)/MCL (ref 4.2–5.4)
SODIUM SERPL-SCNC: 138 MMOL/L (ref 136–145)
WBC # SPEC AUTO: 5.74 X10(3)/MCL (ref 4.5–11.5)

## 2024-04-17 PROCEDURE — 19286 PERQ DEV BREAST ADD US IMAG: CPT | Mod: ,,, | Performed by: RADIOLOGY

## 2024-04-17 PROCEDURE — 77062 BREAST TOMOSYNTHESIS BI: CPT | Mod: TC

## 2024-04-17 PROCEDURE — 36415 COLL VENOUS BLD VENIPUNCTURE: CPT

## 2024-04-17 PROCEDURE — 19285 PERQ DEV BREAST 1ST US IMAG: CPT | Mod: LT

## 2024-04-17 PROCEDURE — 77062 BREAST TOMOSYNTHESIS BI: CPT | Mod: 26,,, | Performed by: RADIOLOGY

## 2024-04-17 PROCEDURE — 77066 DX MAMMO INCL CAD BI: CPT | Mod: 26,,, | Performed by: RADIOLOGY

## 2024-04-17 PROCEDURE — 93005 ELECTROCARDIOGRAM TRACING: CPT

## 2024-04-17 PROCEDURE — 19285 PERQ DEV BREAST 1ST US IMAG: CPT | Mod: LT,,, | Performed by: RADIOLOGY

## 2024-04-17 PROCEDURE — A4648 IMPLANTABLE TISSUE MARKER: HCPCS

## 2024-04-19 ENCOUNTER — ANESTHESIA EVENT (OUTPATIENT)
Dept: SURGERY | Facility: HOSPITAL | Age: 54
End: 2024-04-19
Payer: COMMERCIAL

## 2024-04-19 RX ORDER — DROPERIDOL 2.5 MG/ML
0.25 INJECTION, SOLUTION INTRAMUSCULAR; INTRAVENOUS ONCE AS NEEDED
Status: CANCELLED | OUTPATIENT
Start: 2024-04-19 | End: 2035-09-16

## 2024-04-19 NOTE — ANESTHESIA PREPROCEDURE EVALUATION
04/19/2024  Katherin Johnson is a 54 y.o., female with bilateral breast CA for mass excision and SNB.      Pre-op Assessment    I have reviewed the Patient Summary Reports.     I have reviewed the Nursing Notes. I have reviewed the NPO Status.   I have reviewed the Medications.     Review of Systems  Anesthesia Hx:  No problems with previous Anesthesia             Denies Family Hx of Anesthesia complications.    Denies Personal Hx of Anesthesia complications.                    Hematology/Oncology:  Hematology Normal   Oncology Normal                                   EENT/Dental:  EENT/Dental Normal    Ears General/Symptom(s)    Ear Disease:  Tympanic Membrane Problem        Cardiovascular:  Cardiovascular Normal                                            Pulmonary:  Pulmonary Normal                       Renal/:  Renal/ Normal                 Hepatic/GI:  Hepatic/GI Normal                 Musculoskeletal:  Musculoskeletal Normal                Neurological:  Neurology Normal                                      Endocrine:  Endocrine Normal            Dermatological:  Skin Normal    Psych:  Psychiatric Normal                    Physical Exam  General: Well nourished    Airway:  Mallampati: I   Mouth Opening: Normal  TM Distance: Normal  Tongue: Normal  Neck ROM: Normal ROM    Dental:  Intact, Caps / Implants    Chest/Lungs:  Clear to auscultation, Normal Respiratory Rate    Heart:  Rate: Normal  Rhythm: Regular Rhythm        Anesthesia Plan  Type of Anesthesia, risks & benefits discussed:    Anesthesia Type: Gen Supraglottic Airway  Intra-op Monitoring Plan: Standard ASA Monitors  Post Op Pain Control Plan: multimodal analgesia and IV/PO Opioids PRN  Induction:  IV  Informed Consent: Informed consent signed with the Patient and all parties understand the risks and agree with anesthesia plan.  All questions  answered.   ASA Score: 3  Day of Surgery Review of History & Physical: H&P Update referred to the surgeon/provider.I have interviewed and examined the patient. I have reviewed the patient's H&P dated: There are no significant changes. H&P completed by Anesthesiologist.    Ready For Surgery From Anesthesia Perspective.     .

## 2024-04-22 ENCOUNTER — HOSPITAL ENCOUNTER (OUTPATIENT)
Dept: RADIOLOGY | Facility: HOSPITAL | Age: 54
Discharge: HOME | End: 2024-04-22
Attending: SURGERY | Admitting: SURGERY
Payer: COMMERCIAL

## 2024-04-22 ENCOUNTER — ANESTHESIA (OUTPATIENT)
Dept: SURGERY | Facility: HOSPITAL | Age: 54
End: 2024-04-22
Payer: COMMERCIAL

## 2024-04-22 ENCOUNTER — HOSPITAL ENCOUNTER (OUTPATIENT)
Facility: HOSPITAL | Age: 54
Discharge: HOME | End: 2024-04-22
Attending: SURGERY | Admitting: SURGERY
Payer: COMMERCIAL

## 2024-04-22 DIAGNOSIS — Z98.890 S/P LUMPECTOMY OF BREAST: Primary | ICD-10-CM

## 2024-04-22 DIAGNOSIS — R92.8 ABNORMAL MAMMOGRAM: ICD-10-CM

## 2024-04-22 DIAGNOSIS — D24.1 INTRADUCTAL PAPILLOMA OF BOTH BREASTS: ICD-10-CM

## 2024-04-22 DIAGNOSIS — N64.52 DISCHARGE FROM RIGHT NIPPLE: ICD-10-CM

## 2024-04-22 DIAGNOSIS — D24.2 INTRADUCTAL PAPILLOMA OF BOTH BREASTS: ICD-10-CM

## 2024-04-22 PROCEDURE — 71000033 HC RECOVERY, INTIAL HOUR: Performed by: SURGERY

## 2024-04-22 PROCEDURE — 76098 X-RAY EXAM SURGICAL SPECIMEN: CPT | Mod: TC

## 2024-04-22 PROCEDURE — 76098 X-RAY EXAM SURGICAL SPECIMEN: CPT | Mod: 26,RT,, | Performed by: RADIOLOGY

## 2024-04-22 PROCEDURE — 63600175 PHARM REV CODE 636 W HCPCS: Performed by: NURSE ANESTHETIST, CERTIFIED REGISTERED

## 2024-04-22 PROCEDURE — 63600175 PHARM REV CODE 636 W HCPCS: Performed by: ANESTHESIOLOGY

## 2024-04-22 PROCEDURE — 71000016 HC POSTOP RECOV ADDL HR: Performed by: SURGERY

## 2024-04-22 PROCEDURE — D9220A PRA ANESTHESIA: Mod: ANES,,, | Performed by: ANESTHESIOLOGY

## 2024-04-22 PROCEDURE — 37000009 HC ANESTHESIA EA ADD 15 MINS: Performed by: SURGERY

## 2024-04-22 PROCEDURE — 71000015 HC POSTOP RECOV 1ST HR: Performed by: SURGERY

## 2024-04-22 PROCEDURE — 36000707: Performed by: SURGERY

## 2024-04-22 PROCEDURE — 19125 EXCISION BREAST LESION: CPT | Mod: 50,,, | Performed by: SURGERY

## 2024-04-22 PROCEDURE — 63600175 PHARM REV CODE 636 W HCPCS: Performed by: SURGERY

## 2024-04-22 PROCEDURE — 25000003 PHARM REV CODE 250: Performed by: ANESTHESIOLOGY

## 2024-04-22 PROCEDURE — 37000008 HC ANESTHESIA 1ST 15 MINUTES: Performed by: SURGERY

## 2024-04-22 PROCEDURE — 76098 X-RAY EXAM SURGICAL SPECIMEN: CPT | Mod: 26,LT,, | Performed by: RADIOLOGY

## 2024-04-22 PROCEDURE — 63600175 PHARM REV CODE 636 W HCPCS: Mod: JZ,JG | Performed by: SURGERY

## 2024-04-22 PROCEDURE — 25000003 PHARM REV CODE 250: Performed by: NURSE ANESTHETIST, CERTIFIED REGISTERED

## 2024-04-22 PROCEDURE — 27201423 OPTIME MED/SURG SUP & DEVICES STERILE SUPPLY: Performed by: SURGERY

## 2024-04-22 PROCEDURE — D9220A PRA ANESTHESIA: Mod: CRNA,,, | Performed by: NURSE ANESTHETIST, CERTIFIED REGISTERED

## 2024-04-22 PROCEDURE — 36000706: Performed by: SURGERY

## 2024-04-22 RX ORDER — DIPHENHYDRAMINE HYDROCHLORIDE 50 MG/ML
6.25 INJECTION INTRAMUSCULAR; INTRAVENOUS ONCE AS NEEDED
Status: DISCONTINUED | OUTPATIENT
Start: 2024-04-22 | End: 2024-04-22 | Stop reason: HOSPADM

## 2024-04-22 RX ORDER — ONDANSETRON HYDROCHLORIDE 2 MG/ML
INJECTION, SOLUTION INTRAVENOUS
Status: DISCONTINUED | OUTPATIENT
Start: 2024-04-22 | End: 2024-04-22

## 2024-04-22 RX ORDER — OXYCODONE HYDROCHLORIDE 5 MG/1
5 TABLET ORAL
Status: DISCONTINUED | OUTPATIENT
Start: 2024-04-22 | End: 2024-04-22 | Stop reason: HOSPADM

## 2024-04-22 RX ORDER — GABAPENTIN 300 MG/1
300 CAPSULE ORAL
Status: COMPLETED | OUTPATIENT
Start: 2024-04-22 | End: 2024-04-22

## 2024-04-22 RX ORDER — GLYCOPYRROLATE 0.2 MG/ML
INJECTION INTRAMUSCULAR; INTRAVENOUS
Status: DISCONTINUED | OUTPATIENT
Start: 2024-04-22 | End: 2024-04-22

## 2024-04-22 RX ORDER — FENTANYL CITRATE 50 UG/ML
INJECTION, SOLUTION INTRAMUSCULAR; INTRAVENOUS
Status: DISCONTINUED | OUTPATIENT
Start: 2024-04-22 | End: 2024-04-22

## 2024-04-22 RX ORDER — TRAMADOL HYDROCHLORIDE 50 MG/1
50 TABLET ORAL EVERY 4 HOURS PRN
Status: DISCONTINUED | OUTPATIENT
Start: 2024-04-22 | End: 2024-04-22 | Stop reason: HOSPADM

## 2024-04-22 RX ORDER — HYDROMORPHONE HYDROCHLORIDE 2 MG/ML
0.4 INJECTION, SOLUTION INTRAMUSCULAR; INTRAVENOUS; SUBCUTANEOUS EVERY 5 MIN PRN
Status: DISCONTINUED | OUTPATIENT
Start: 2024-04-22 | End: 2024-04-22 | Stop reason: HOSPADM

## 2024-04-22 RX ORDER — ACETAMINOPHEN 500 MG
1000 TABLET ORAL
Status: COMPLETED | OUTPATIENT
Start: 2024-04-22 | End: 2024-04-22

## 2024-04-22 RX ORDER — LIDOCAINE HYDROCHLORIDE 20 MG/ML
INJECTION INTRAVENOUS
Status: DISCONTINUED | OUTPATIENT
Start: 2024-04-22 | End: 2024-04-22

## 2024-04-22 RX ORDER — TRAMADOL HYDROCHLORIDE 50 MG/1
50 TABLET ORAL EVERY 6 HOURS PRN
Qty: 28 TABLET | Refills: 0 | Status: SHIPPED | OUTPATIENT
Start: 2024-04-22 | End: 2024-04-29

## 2024-04-22 RX ORDER — KETOROLAC TROMETHAMINE 30 MG/ML
30 INJECTION, SOLUTION INTRAMUSCULAR; INTRAVENOUS ONCE
Status: COMPLETED | OUTPATIENT
Start: 2024-04-22 | End: 2024-04-22

## 2024-04-22 RX ORDER — SODIUM CHLORIDE, SODIUM LACTATE, POTASSIUM CHLORIDE, CALCIUM CHLORIDE 600; 310; 30; 20 MG/100ML; MG/100ML; MG/100ML; MG/100ML
INJECTION, SOLUTION INTRAVENOUS CONTINUOUS
Status: DISCONTINUED | OUTPATIENT
Start: 2024-04-22 | End: 2024-04-22 | Stop reason: HOSPADM

## 2024-04-22 RX ORDER — METOCLOPRAMIDE HYDROCHLORIDE 5 MG/ML
10 INJECTION INTRAMUSCULAR; INTRAVENOUS ONCE AS NEEDED
Status: DISCONTINUED | OUTPATIENT
Start: 2024-04-22 | End: 2024-04-22 | Stop reason: HOSPADM

## 2024-04-22 RX ORDER — BUPIVACAINE HYDROCHLORIDE 5 MG/ML
INJECTION, SOLUTION EPIDURAL; INTRACAUDAL
Status: DISCONTINUED
Start: 2024-04-22 | End: 2024-04-22 | Stop reason: HOSPADM

## 2024-04-22 RX ORDER — BUPIVACAINE HYDROCHLORIDE 5 MG/ML
INJECTION, SOLUTION EPIDURAL; INTRACAUDAL
Status: DISCONTINUED | OUTPATIENT
Start: 2024-04-22 | End: 2024-04-22 | Stop reason: HOSPADM

## 2024-04-22 RX ORDER — CEFAZOLIN SODIUM 1 G/3ML
INJECTION, POWDER, FOR SOLUTION INTRAMUSCULAR; INTRAVENOUS
Status: DISCONTINUED | OUTPATIENT
Start: 2024-04-22 | End: 2024-04-22 | Stop reason: HOSPADM

## 2024-04-22 RX ORDER — MIDAZOLAM HYDROCHLORIDE 1 MG/ML
2 INJECTION INTRAMUSCULAR; INTRAVENOUS ONCE
Status: DISCONTINUED | OUTPATIENT
Start: 2024-04-22 | End: 2024-04-22 | Stop reason: HOSPADM

## 2024-04-22 RX ORDER — CEFAZOLIN SODIUM 1 G/3ML
2 INJECTION, POWDER, FOR SOLUTION INTRAMUSCULAR; INTRAVENOUS
Status: DISCONTINUED | OUTPATIENT
Start: 2024-04-22 | End: 2024-04-22 | Stop reason: HOSPADM

## 2024-04-22 RX ORDER — DEXAMETHASONE SODIUM PHOSPHATE 4 MG/ML
INJECTION, SOLUTION INTRA-ARTICULAR; INTRALESIONAL; INTRAMUSCULAR; INTRAVENOUS; SOFT TISSUE
Status: DISCONTINUED | OUTPATIENT
Start: 2024-04-22 | End: 2024-04-22

## 2024-04-22 RX ORDER — MIDAZOLAM HYDROCHLORIDE 2 MG/2ML
1 INJECTION, SOLUTION INTRAMUSCULAR; INTRAVENOUS
Status: DISCONTINUED | OUTPATIENT
Start: 2024-04-22 | End: 2024-04-22 | Stop reason: HOSPADM

## 2024-04-22 RX ORDER — PROPOFOL 10 MG/ML
INJECTION, EMULSION INTRAVENOUS
Status: DISCONTINUED | OUTPATIENT
Start: 2024-04-22 | End: 2024-04-22

## 2024-04-22 RX ORDER — TRAMADOL HYDROCHLORIDE 50 MG/1
100 TABLET ORAL
Status: COMPLETED | OUTPATIENT
Start: 2024-04-22 | End: 2024-04-22

## 2024-04-22 RX ORDER — LIDOCAINE HYDROCHLORIDE 10 MG/ML
1 INJECTION, SOLUTION EPIDURAL; INFILTRATION; INTRACAUDAL; PERINEURAL ONCE
Status: DISCONTINUED | OUTPATIENT
Start: 2024-04-22 | End: 2024-04-22 | Stop reason: HOSPADM

## 2024-04-22 RX ORDER — DEXMEDETOMIDINE HYDROCHLORIDE 100 UG/ML
INJECTION, SOLUTION INTRAVENOUS
Status: DISCONTINUED | OUTPATIENT
Start: 2024-04-22 | End: 2024-04-22

## 2024-04-22 RX ORDER — ONDANSETRON HYDROCHLORIDE 2 MG/ML
4 INJECTION, SOLUTION INTRAVENOUS EVERY 12 HOURS PRN
Status: DISCONTINUED | OUTPATIENT
Start: 2024-04-22 | End: 2024-04-22 | Stop reason: HOSPADM

## 2024-04-22 RX ORDER — MIDAZOLAM HYDROCHLORIDE 2 MG/2ML
1 INJECTION, SOLUTION INTRAMUSCULAR; INTRAVENOUS ONCE AS NEEDED
Status: COMPLETED | OUTPATIENT
Start: 2024-04-22 | End: 2024-04-22

## 2024-04-22 RX ORDER — CEFAZOLIN SODIUM 1 G/3ML
INJECTION, POWDER, FOR SOLUTION INTRAMUSCULAR; INTRAVENOUS
Status: COMPLETED
Start: 2024-04-22 | End: 2024-04-22

## 2024-04-22 RX ADMIN — DEXMEDETOMIDINE 6 MCG: 200 INJECTION, SOLUTION INTRAVENOUS at 07:04

## 2024-04-22 RX ADMIN — PROPOFOL 200 MG: 10 INJECTION, EMULSION INTRAVENOUS at 07:04

## 2024-04-22 RX ADMIN — ONDANSETRON 4 MG: 2 INJECTION INTRAMUSCULAR; INTRAVENOUS at 08:04

## 2024-04-22 RX ADMIN — DEXMEDETOMIDINE 8 MCG: 200 INJECTION, SOLUTION INTRAVENOUS at 07:04

## 2024-04-22 RX ADMIN — FENTANYL CITRATE 50 MCG: 50 INJECTION, SOLUTION INTRAMUSCULAR; INTRAVENOUS at 07:04

## 2024-04-22 RX ADMIN — SODIUM CHLORIDE, POTASSIUM CHLORIDE, SODIUM LACTATE AND CALCIUM CHLORIDE: 600; 310; 30; 20 INJECTION, SOLUTION INTRAVENOUS at 08:04

## 2024-04-22 RX ADMIN — KETOROLAC TROMETHAMINE 30 MG: 30 INJECTION, SOLUTION INTRAMUSCULAR at 09:04

## 2024-04-22 RX ADMIN — DEXAMETHASONE SODIUM PHOSPHATE 4 MG: 4 INJECTION, SOLUTION INTRA-ARTICULAR; INTRALESIONAL; INTRAMUSCULAR; INTRAVENOUS; SOFT TISSUE at 07:04

## 2024-04-22 RX ADMIN — GLYCOPYRROLATE 0.2 MG: 0.2 INJECTION INTRAMUSCULAR; INTRAVENOUS at 07:04

## 2024-04-22 RX ADMIN — ACETAMINOPHEN 1000 MG: 500 TABLET ORAL at 06:04

## 2024-04-22 RX ADMIN — CEFAZOLIN 2 G: 330 INJECTION, POWDER, FOR SOLUTION INTRAMUSCULAR; INTRAVENOUS at 07:04

## 2024-04-22 RX ADMIN — LIDOCAINE HYDROCHLORIDE 50 MG: 20 INJECTION INTRAVENOUS at 07:04

## 2024-04-22 RX ADMIN — GABAPENTIN 300 MG: 300 CAPSULE ORAL at 06:04

## 2024-04-22 RX ADMIN — MIDAZOLAM HYDROCHLORIDE 1 MG: 1 INJECTION, SOLUTION INTRAMUSCULAR; INTRAVENOUS at 07:04

## 2024-04-22 RX ADMIN — TRAMADOL HYDROCHLORIDE 100 MG: 50 TABLET, COATED ORAL at 06:04

## 2024-04-22 NOTE — ANESTHESIA PROCEDURE NOTES
LMA insertion    Date/Time: 4/22/2024 7:16 AM    Performed by: Julieta Mckeon CRNA  Authorized by: Joya Nice MD    Intubation:     Induction:  Intravenous    Intubated:  Postinduction    Mask Ventilation:  Easy mask    Attempts:  1    Attempted By:  CRNA    Method of Intubation:  Blind intubation    Difficult Airway Encountered?: No      Complications:  None    Airway Device:  Supraglottic airway/LMA    Airway Device Size:  3.0    Style/Cuff Inflation:  Cuffed (inflated to minimal occlusive pressure)    Secured at:  The teeth    Placement Verified By:  Capnometry    Complicating Factors:  None    Findings Post-Intubation:  BS equal bilateral

## 2024-04-22 NOTE — TRANSFER OF CARE
"Anesthesia Transfer of Care Note    Patient: Katherin Alex    Procedure(s) Performed: Procedure(s) (LRB):  EXCISION,MASS,BREAST,USING RADIOLOGICAL MARKER - Magseed Localization - BILATERAL Need Sentimag Need Faxitron Need sterile charms (Bilateral)    Patient location: PACU    Anesthesia Type: general    Transport from OR: Transported from OR on room air with adequate spontaneous ventilation    Post pain: adequate analgesia    Post assessment: no apparent anesthetic complications and tolerated procedure well    Post vital signs: stable    Level of consciousness: responds to stimulation    Nausea/Vomiting: no nausea/vomiting    Complications: none    Transfer of care protocol was followed      Last vitals: Visit Vitals  BP (!) 165/89   Pulse 62   Temp 36.7 °C (98.1 °F) (Oral)   Resp 20   Ht 5' 2" (1.575 m)   Wt 80.1 kg (176 lb 9.4 oz)   SpO2 98%   Breastfeeding No   BMI 32.30 kg/m²     "

## 2024-04-22 NOTE — BRIEF OP NOTE
[facility]  Brief Operative Note     SUMMARY     Surgery Date: 4/22/2024     Surgeon: Myranda Breaux MD    Assist: Abida Pires PA-C    Pre-op Diagnosis:  Intraductal papilloma of both breasts [D24.1, D24.2]  Discharge from right nipple [N64.52]    Post-op Diagnosis:  Post-Op Diagnosis Codes:     * Intraductal papilloma of both breasts [D24.1, D24.2]     * Discharge from right nipple [N64.52]    Procedure(s) (LRB):  EXCISION,MASS,BREAST,USING RADIOLOGICAL MARKER - Magseed Localization - BILATERAL Need Sentimag Need Faxitron Need sterile charms (Bilateral)    Anesthesia: General    Findings/Key Components: Removal of bilateral breast tissue with intraductal papilloma.    Estimated Blood Loss: 5 ml         Specimens:   Specimen (24h ago, onward)       Start     Ordered    04/22/24 0812  Specimen to Pathology  RELEASE UPON ORDERING        References:    Click here for ordering Quick Tip   Question:  Release to patient  Answer:  Immediate    04/22/24 0812                  ID Type Source Tests Collected by Time Destination   A : LEFT BREAST EXCISIONAL MASS (CHARMS ISIS MARGINS) Tissue Breast, Left SPECIMEN TO PATHOLOGY Myranda Breaux MD 4/22/2024 0747    B : RIGHT BREAST EXCISIONAL MASS (charms isis margins) Tissue Breast, Right SPECIMEN TO PATHOLOGY Myranda Breaux MD 4/22/2024 0747        Discharge Note    SUMMARY     Admit Date: 4/22/2024    Discharge Date and Time:  04/22/2024 8:32 AM    Hospital Course (synopsis of major diagnoses, care, treatment, and services provided during the course of the hospital stay): She is status post bilateral excisional breast biopsy.     Final Diagnosis: Post-Op Diagnosis Codes:     * Intraductal papilloma of both breasts [D24.1, D24.2]     * Discharge from right nipple [N64.52]    Disposition: Home or Self Care    Follow Up/Patient Instructions:    Follow-up Information       Abida Pires PA Follow up.    Specialty: Physician  Assistant  Why: 4/30/2024 9:40 AM  Contact information:  [facility] [address]  [place] [address]  [phone]                             Medications:  Reconciled Home Medications:      Medication List        START taking these medications      traMADoL 50 mg tablet  Commonly known as: ULTRAM  Take 1 tablet (50 mg total) by mouth every 6 (six) hours as needed for Pain.            CONTINUE taking these medications      dextroamphetamine-amphetamine 20 mg tablet  Commonly known as: ADDERALL  Take 1 tablet by mouth 2 (two) times daily.     ergocalciferol 50,000 unit Cap  Commonly known as: ERGOCALCIFEROL  Take 50,000 Units by mouth every 7 days.     EScitalopram oxalate 20 MG tablet  Commonly known as: LEXAPRO  Take 20 mg by mouth every evening.     indomethacin 50 MG capsule  Commonly known as: INDOCIN  Take 1 capsule (50 mg total) by mouth 3 (three) times daily with meals.     zolpidem 10 mg Tab  Commonly known as: AMBIEN  Take 10 mg by mouth every evening.            STOP taking these medications      PREMARIN 0.9 mg Tab  Generic drug: estrogens (conjugated)            Discharge Procedure Orders   Diet general     Ice to affected area     Lifting restrictions     Remove dressing in 24 hours   Order Comments: Leave glue and steri strips until clinic visit     Call MD for:  temperature >100.4     Call MD for:  persistent nausea and vomiting     Call MD for:  severe uncontrolled pain     Call MD for:  difficulty breathing, headache or visual disturbances     Call MD for:  redness, tenderness, or signs of infection (pain, swelling, redness, odor or green/yellow discharge around incision site)     Call MD for:  hives     Call MD for:  persistent dizziness or light-headedness      Follow-up Information       Abida Pires PA Follow up.    Specialty: Physician Assistant  Why: 4/30/2024 9:40 AM  Contact information:  [facility] [facility] [address]  [place] [address]  [phone]

## 2024-04-22 NOTE — ANESTHESIA POSTPROCEDURE EVALUATION
Anesthesia Post Evaluation    Patient: Katherin Johnson    Procedure(s) Performed: Procedure(s) (LRB):  EXCISION,MASS,BREAST,USING RADIOLOGICAL MARKER - Magseed Localization - BILATERAL Need Sentimag Need Faxitron Need sterile charms (Bilateral)    Final Anesthesia Type: general      Patient location during evaluation: PACU  Patient participation: Yes- Able to Participate  Level of consciousness: awake and alert  Post-procedure vital signs: reviewed and stable  Pain management: adequate  Airway patency: patent    PONV status at discharge: No PONV  Anesthetic complications: no      Cardiovascular status: hemodynamically stable  Respiratory status: unassisted  Hydration status: euvolemic  Follow-up not needed.              Vitals Value Taken Time   /82 04/22/24 1008   Temp 36.8 °C (98.2 °F) 04/22/24 0938   Pulse 57 04/22/24 1008   Resp 18 04/22/24 0938   SpO2 96 % 04/22/24 1015         Event Time   Out of Recovery 09:20:00         Pain/Lory Score: Pain Rating Prior to Med Admin: 7 (4/22/2024  9:39 AM)  Pain Rating Post Med Admin: 5 (pt drinking coke, smiling) (4/22/2024  9:55 AM)  Lory Score: 10 (4/22/2024  9:25 AM)  Modified Lory Score: 20 (4/22/2024 10:21 AM)

## 2024-04-22 NOTE — DISCHARGE INSTRUCTIONS
BREAST SURGERY POST-OP INSTRUCTIONS  DR. KELLEY REINOSO PA-C     How do I care for my incisions?  You and your caregiver should look at your incision daily. Call your doctor if you see any redness or drainage from your incision.  You will be given a support bra, wear this for 24 hours a day (unless showering or sponge bathing) for comfort and to help decrease amount of swelling. If the bra fits too loose or too tight you may go to your local store a purchase a front opening sports bra that fits snug.   Your incision will be closed with sutures (stitches) under your skin. These sutures dissolve on their own, so they do not need to be removed.  · If you go home with Steri-StripsTM on your incision, they will loosen and fall off by themselves. If they havent fallen off within 14 days, you may remove them.  · If you go home with glue over your sutures (stitches), it will also loosen and peel off, similarly to the Steri-Strips.  ** If you have had a Mastectomy and/or Reconstruction and/or Axillary Lymph Node Dissection:  You may have 1-2 Breezy-Lovell Drains in place. Please refer to the additional instructions discussing care of your drains.     Is it normal to feel new sensations?  As you are healing, you may feel a several different sensations in your breast. Tenderness, numbness, and twinges are common examples. These sensations usually come and go, and will lessen over time, usually within the first few months after surgery. As you continue to heal, you may feel scar tissue along your incision site. It will feel hard. This is common and will soften over the next several months.     Can I shower?  You can shower 24 hours after your surgery. Taking a warm shower is relaxing and can help decrease discomfort. Use soap when you shower and gently wash your incision. Pat the areas dry with a towel after showering, and leave your incision uncovered, unless you have drainage from your incision. If you  have drainage, call your doctors office.  Do not take tub baths, swim, or use hot tubs or saunas until you discuss it with your doctor at the first appointment after your surgery.    Will I have pain when I am home?  The length of time each person has pain or discomfort varies. You will be given a prescription for pain medication before you go home. Follow the guidelines below to manage your pain.  · Take your medication as directed and as needed.  · Icing the affected area can also alleviate pain symptoms (ice the affected area at least four times a day, 15-20 minutes at a time).  · Call your doctor if the pain medication prescribed for you doesnt relieve your pain.  · Do not drive or drink alcohol while you are taking prescription pain medication.  · As your incision heals, you will have less pain and need less pain medication. A mild pain reliever such as acetaminophen (Tylenol) or ibuprofen (Advil) will relieve aches and discomfort. However, large quantities of acetaminophen may be harmful to your liver. Do not take more acetaminophen than the amount directed on the bottle or as instructed by your doctor or nurse.  · Pain medication should help you as you resume your normal activities. Pain medication is most effective 30 to 45 minutes after taking it.  · Keep track of when you take your pain medication. Taking it when your pain first begins is more effective than waiting for the pain to get worse.  Pain medication may cause constipation (having fewer bowel movements than what is normal for you).    How can I prevent constipation?  · Go to the bathroom at the same time every day. Your body will get used to going at that time.  · If you feel the urge to go, do not put it off. Try to use the bathroom 5 to 15 minutes after meals.  · After breakfast is a good time to move your bowels because the reflexes in your colon are strongest then.  · Exercise if you can; walking is an excellent form of exercise.  · Drink 8  (8-ounce) glasses (2 liters) of liquids daily, if you can. Drink water, juices, soups, ice cream shakes, and other drinks that do not have caffeine. Beverages with caffeine, such as coffee and soda, pull fluid out of the body.  · Slowly increase the fiber in your diet to 25 to 35 grams per day. Fruits, vegetables, whole grains, and cereals contain fiber. If you have an ostomy or have had recent bowel surgery, check with your doctor or nurse before making any changes in your diet.  · Both over-the-counter and prescription medications are available to treat constipation. Start with 1 of the following over-the-counter medications first:  o Docusate sodium (Colace®) 100 mg. Take __1___ capsules __1___ time a day. This is a stool softener that causes few side effects. Do not take it with mineral oil.  o Polyethylene glycol (MiraLAX®) 17 grams daily.  o Senna (Senokot®) 2 tablets at bedtime. This is a stimulant laxative, which can cause cramping.  · If you havent had a bowel movement in 2 days, call your doctor or nurse.    Will I be able to eat?  You can resume eating when you go home after surgery. Eating a balanced diet high in protein will help you heal after surgery. Your diet should include a healthy protein source at each meal, as well as fruits, vegetables, and whole grains. If you have questions about your diet, ask to see a dietitian.    When is it safe for me to drive and what activities can I perform?  You may resume driving after surgery as long as you are not taking prescription pain medication that may make you drowsy, and you have your full range of motion.  You should not lift anything heavier than 10-15 lbs the first week. After this time, you may gradually increase the amount of weight. You want to take it easy the first 2 weeks, no strenuous or repetitive movements such as vacuuming or scrubbing. Walking is okay. Ask the doctor if you have questions.    How long until I have the pathology  results?  The pathology report usually takes to 7 to 10 business days.    When is my first appointment after my surgery?  You should be given or schedule a follow-up appointment 1 to 2 weeks after your surgery.    How can I cope with my feelings?   After surgery for a serious illness, you may have new and upsetting feelings. Many patients say they felt sad, worried, nervous, irritable, or angry at one time or another. You may find that you cannot control some of these feelings. If this happens, its a good idea to seek emotional support.  The first step in coping is to talk about how you feel. Family and friends can help. Your nurse, doctor, and  can reassure, support, and guide you. It is always a good idea to let these professionals know how you, your family, and your friends are feeling emotionally. Many resources are available to patients and their families. Whether you are in the hospital or at home, we are here to help you and your family and friends handle the emotional aspects of your illness.    What if I have other questions?  If you have any questions or concerns, please talk with your doctor or nurse. You can reach them Monday through Thursday from 9:00 AM to 5:00 PM and Friday from 9:00 AM to 12:00 PM at [phone].  After 5:00 PM M-Th or 12:00 PM Fri, during the weekend, and on holidays, please call [phone] and ask for the doctor on call.    PLEASE CALL YOUR DOCTOR OR NURSE IF YOU HAVE:  · A temperature of 101° F (38.3° C) or higher  · Shortness of breath  · Warmer than normal skin around your incision  · Increased discomfort in the area  · Increased redness around your incision  · New or increased swelling around your incision  · Discharge from your incision

## 2024-04-22 NOTE — PROGRESS NOTES
Op site dressing noted, surrounding area soft and warm to the touch, color WDL.  Surgical bra noted over dressing

## 2024-04-22 NOTE — OP NOTE
DATE OF PROCEDURE: 4/22/2024    SURGEON: Myranda Breaux M.D.    ASSISTANT:  Abida Pires PA-C    PREOPERATIVE DIAGNOSIS: Intraductal papilloma of both breasts [D24.1, D24.2]  Discharge from right nipple [N64.52]     POSTOPERATIVE DIAGNOSIS: Post-Op Diagnosis Codes:     * Intraductal papilloma of both breasts [D24.1, D24.2]     * Discharge from right nipple [N64.52]     ANESTHESIA: General Anesthesiologist: Joya Nice MD  CRNA: Julieta Mckeon CRNA     PROCEDURES PERFORMED:   1. Bilateral breast excisional biopsy with MagSeed localization     EXCISION,MASS,BREAST,USING RADIOLOGICAL MARKER - Magseed Localization - BILATERAL Need Sentimag Need Faxitron Need sterile charms:        PROCEDURE IN DETAIL:   Katherin Johnson is a 54 y.o. female brought to the operating room for definitive surgical management of recent core biopsy revealing bilateral intraductal papilloma. The patient has elected to undergo excisional biopsy for further evaluation. The patient was informed of the possible risks and complications of the procedure, including but not limited to anesthetic risks, bleeding, infection, and need for additional surgery. The patient concurred with the proposed plan, and has given informed consent. The site of surgery was properly noted/marked in the preoperative holding area. The patient underwent informed consent.     The MagSeed was placed in the left breast 3 o'clock 2 cm FN lesion and the right breast 5 o'clock subareolar lesion. The MagSeed localization films were reviewed prior to surgery.    She was then brought to the Operating Room and placed in the supine position. Anesthesia was administered. The bilateral breast, anterior chest, arm and axilla were then prepped and draped in a sterile fashion.     Left Breast Excisional Biopsy  A circumareolar incision was made in the left breast to allow access to the anticipated tract of the seed at 3 o'clock 2 cm FN. The incision was deepened with  electrocautery through the subcutaneous tissues. The seed was localized within the breast tissue. The specimen was dissected circumferentially around the seed and area of concern. The specimen was completely dissected free. The seed localized specimen was marked with sterile margin charms and submitted for specimen radiograph. Specimen radiograph confirmed the seed, clip and area of interest were within the specimen.    Within the lumpectomy cavity, hemostasis was achieved with cautery. The excision cavity was irrigated until clear. There was no evidence of bleeding. It was closed in multiple layers with deep dermal and subcutaneous interrupted 3-0 Monocryl sutures and a running 4-0 Monocryl subcuticular skin closure. Dermabond was applied followed by sterile dressings.    Right Breast Excisional Biopsy  A circumareolar incision was made in the left breast to allow access to the anticipated tract of the seed at 5 o'clock subareolar. The incision was deepened with electrocautery through the subcutaneous tissues. The seed was localized within the breast tissue. The specimen was dissected circumferentially around the seed and area of concern. The specimen was completely dissected free. The seed localized specimen was marked with sterile margin charms and submitted for specimen radiograph. Specimen radiograph confirmed the seed, clip and area of interest were within the specimen.    Within the lumpectomy cavity, hemostasis was achieved with cautery. The excision cavity was irrigated until clear. There was no evidence of bleeding. It was closed in multiple layers with deep dermal and subcutaneous interrupted 3-0 Monocryl sutures and a running 4-0 Monocryl subcuticular skin closure. Dermabond was applied followed by sterile dressings.    All instruments and sponge counts were correct at the end of the procedure.     Significant Surgical Tasks Conducted by the Assistant(s), if Applicable: The skilled assistance of the  Physician Assistant, Abida Pires PA-C, was necessary for the successful completion of this case. She was essential for proper positioning of the patient, manipulation of instruments, proper exposure, manipulation of tissue, and wound closure.       ESTIMATED BLOOD LOSS: 5 ml    Implants: * No implants in log *    Specimens:   Specimen (24h ago, onward)       Start     Ordered    04/22/24 0812  Specimen to Pathology  RELEASE UPON ORDERING        References:    Click here for ordering Quick Tip   Question:  Release to patient  Answer:  Immediate    04/22/24 0812                   ID Type Source Tests Collected by Time Destination   A : LEFT BREAST EXCISIONAL MASS (CHARMS ISIS MARGINS) Tissue Breast, Left SPECIMEN TO PATHOLOGY Myranda Breaux MD 4/22/2024 0747    B : RIGHT BREAST EXCISIONAL MASS (charms isis margins) Tissue Breast, Right SPECIMEN TO PATHOLOGY Myranda Breaux MD 4/22/2024 0747                 Condition: Good    Disposition: PACU - hemodynamically stable.    Attestation: I performed the procedure.

## 2024-04-23 ENCOUNTER — TELEPHONE (OUTPATIENT)
Dept: SURGERY | Facility: CLINIC | Age: 54
End: 2024-04-23
Payer: COMMERCIAL

## 2024-04-23 VITALS
HEART RATE: 57 BPM | TEMPERATURE: 98 F | WEIGHT: 176.56 LBS | RESPIRATION RATE: 18 BRPM | DIASTOLIC BLOOD PRESSURE: 82 MMHG | OXYGEN SATURATION: 96 % | HEIGHT: 62 IN | BODY MASS INDEX: 32.49 KG/M2 | SYSTOLIC BLOOD PRESSURE: 130 MMHG

## 2024-04-23 NOTE — TELEPHONE ENCOUNTER
Called patient. She states that the Tramadol is not helping her. Patient states that she is wearing her support bra and using ice.  Spoke with Dr. Breaux who states that to increase Tramadol to 2 tablets every 6 hours, and to use Aleve at night.  Patient verbalized understanding.             -- Message from Virginie Irby sent at 4/23/2024 10:58 AM CDT -----  Regarding: Pain After Surgery  Good morning,    Patient called c/o being in pain after surgery. Please advise

## 2024-04-24 ENCOUNTER — TELEPHONE (OUTPATIENT)
Dept: SURGERY | Facility: CLINIC | Age: 54
End: 2024-04-24
Payer: COMMERCIAL

## 2024-04-24 LAB — PSYCHE PATHOLOGY RESULT: NORMAL

## 2024-04-24 NOTE — TELEPHONE ENCOUNTER
Patient called with her pathology results..        ----- Message from Myranda Breaux MD sent at 4/24/2024  3:39 PM CDT -----  Please call the patient and inform pathology results revealed benign findings and no evidence of cancer. Pathology did reveals some findings that can increase her risk for future development of cancer.    Further discussion will be had at their post-op/follow-up appointment.

## 2024-04-29 NOTE — PROGRESS NOTES
[facility] - [facility] [place] [facility]  Breast Surgical Oncology  Follow Up Patient Office Visit - H&P      Referring Provider: No ref. provider found   PCP: Bobbi Sierra MD     Chief Complaint:   Chief Complaint   Patient presents with    Post-op Evaluation     Patient c/o intermittent aching pain in bilateral breast; no swelling, redness, no signs of infection        Subjective:     Treatments:  2024 S/P bilateral excisional biopsy     Interval History:  2024 - Kathrein Johnson returns today for post-op. She is s/p bilateral excisional biopsy. Surgical pathology revealed intraductal papillomas bilaterally, but with focal ADH and ALH on the left breast. She is doing well since surgery and has no complaints other than soreness to the breasts since surgery, well managed with ibuprofen.    HPI:  Katherin Johnson is a 54 y.o. female who presents on 2024 for evaluation of  newly diagnosed bilateral intraductal papillomas .     A detailed patient history was obtained and reviewed. She reports right nipple discharge which prompted her imaging work-up. She describes the discharge as yellow-clear. She did not notice any lumps/masses, skin changes, or other findings.    MG breast density: Category B (scattered areas of fibroglandular tissue)     Imagin2024  BL DG MG/BL US BREAST LIMITED at [facility]-  IMPRESSION: BENIGN, ULTRASOUND SUSPICIOUS OF MALIGNANCY   A) R breast 5 o'clock subareolar 0.7 x 0.3 x 0.8 cm oval hypoechoic circumscribed vascular intraductal mass within a mildly dilated ducts correlates with the patient's history of right nipple discharge and is suspicious.  BI-RADS 4: Suspicious.    B) L breast 3 o'clock  2 cm FN 0.6 x 0.4 x 0.5 cm oval nonparallel hypoechoic circumscribed vascular mass was incidentally discovered and is suspicious.  BI-RADS 4: Suspicious.  C) No mammographic evidence of malignancy in either breast.    Pathology:  3/5/2024  Ultrasound-guided Core Needle  Biopsy Left Breast 3 o'clock 2 cm FN-  -Sclerosing intraductal papilloma(measuring 3.5 mm) with superimposed organizing hemorrhage  -Focal moderate to florid ductal epithelial hyperplasia of the usual type    3/5/2024  Ultrasound-guided Core Needle Biopsy Right Breast 5 o'clock SA-  -Sclerosing intraductal papilloma (measuring 2.5 mm)  - No evidence of malignancy    2024 Bilateral excisional breast biopsy  Left - intraductal papilloma, focal ADH, ALH. Proliferative fibrocystic changes. Site of prior biopsy. Negative for in situ or invasive carcinoma.  Right - intraductal papilloma, partially sclerosed. Proliferative fibrocystic changes. Site of prior biopsy. Calcifications within benign lobules. Negative for invasive or in situ carcinoma.    OB/GYN History:  Age at Menarche Onset: 16  Menopausal Status: postmenopausal, LMP: No LMP recorded. Patient is postmenopausal.  Hysterectomy/Oophorectomy: hysterectomy with BSO, at age 46  Hormonal birth control (duration): Yes OCP (estrogen/progesterone).   Pregnancy History:   Age at first live birth: 17  Hormone Replacement Therapy: Yes,  Premarin 0.9 mg (started at age 46 still taking)  Patient did not breast feed.  Patient admits to nipple discharge in right breast.   Patient admits to to previous breast biopsy.( removal of a cyst)  Patient denies to a personal history of breast cancer.    Other Relevant History:  Prior thoracic RT: none  Genetic testing: No  Ashkenazi Taoist descent: No    Family History:  Family History   Problem Relation Name Age of Onset    Breast cancer Paternal Grandmother Karoline Small 80    Heart failure Mother Sarah Johnson         Triple bypass    Heart disease Mother Sarah Johnson     Cancer Father Matildefilemon Alex Sr.         Throat cancer        Past History:  Past Medical History:   Diagnosis Date    Anxiety     Breast lump or mass     bilat    Insomnia         Past Surgical History:   Procedure Laterality Date     SECTION       I have 4 :  ,,,    COSMETIC SURGERY  2018    Had a tummy tuck    EXCISION, MASS, BREAST, USING RADIOLOGICAL MARKER Bilateral 2024    Procedure: EXCISION,MASS,BREAST,USING RADIOLOGICAL MARKER - Magseed Localization - BILATERAL Need Sentimag Need Faxitron Need sterile charms;  Surgeon: Myranda Breaux MD;  Location: [facility] OR;  Service: General;  Laterality: Bilateral;  Need Sentimag  Need Faxitron  Need sterile charms    HYSTERECTOMY      Had an hysterectomy in that year    OOPHORECTOMY  2016    When i bailee my hysterectomy    SPINE SURGERY  2012    (Neck)Dr.Jason Mckeon        Social History     Socioeconomic History    Marital status:    Tobacco Use    Smoking status: Never    Smokeless tobacco: Never    Tobacco comments:     N/A   Substance and Sexual Activity    Alcohol use: Never    Drug use: Never    Sexual activity: Not Currently     Partners: Male     Birth control/protection: None        Body mass index is 33.47 kg/m².     Allergy/Medications:   Review of patient's allergies indicates:  No Known Allergies       Current Outpatient Medications:     dextroamphetamine-amphetamine (ADDERALL) 20 mg tablet, Take 1 tablet by mouth 2 (two) times daily., Disp: , Rfl:     ergocalciferol (ERGOCALCIFEROL) 50,000 unit Cap, Take 50,000 Units by mouth every 7 days., Disp: , Rfl:     EScitalopram oxalate (LEXAPRO) 20 MG tablet, Take 20 mg by mouth every evening., Disp: , Rfl:     indomethacin (INDOCIN) 50 MG capsule, Take 1 capsule (50 mg total) by mouth 3 (three) times daily with meals., Disp: 15 capsule, Rfl: 0    naproxen (NAPROSYN) 500 MG tablet, Take 500 mg by mouth every 8 (eight) hours as needed., Disp: , Rfl:     zolpidem (AMBIEN) 10 mg Tab, Take 10 mg by mouth every evening., Disp: , Rfl:        Review of Systems:  Review of Systems   Constitutional:  Negative for chills, fever, malaise/fatigue and weight loss.   HENT:  Negative for congestion and sore throat.   "  Eyes:  Negative for blurred vision and double vision.   Respiratory:  Negative for cough, shortness of breath and wheezing.    Cardiovascular:  Negative for chest pain, palpitations and leg swelling.   Gastrointestinal:  Negative for abdominal pain, blood in stool, constipation, diarrhea, heartburn, nausea and vomiting.   Genitourinary:  Negative for dysuria, flank pain, frequency, hematuria and urgency.   Musculoskeletal:  Negative for back pain, joint pain and myalgias.   Skin:  Negative for rash.   Neurological:  Negative for dizziness, tingling, sensory change, weakness and headaches.   Endo/Heme/Allergies:  Does not bruise/bleed easily.   Psychiatric/Behavioral:  Negative for depression. The patient is not nervous/anxious and does not have insomnia.           Objective:     Vitals:  Blood pressure (!) 158/73, pulse 76, temperature 97.9 °F (36.6 °C), temperature source Oral, resp. rate 20, height 5' 2" (1.575 m), weight 83 kg (183 lb), SpO2 95%.      Physical Exam:  General: The patient is awake, alert and oriented times three. The patient is well nourished and in no acute distress.    Musculoskeletal: The patient has a normal range of motion of her bilateral upper extremities.    Breast: The incisions are healing well. No tenderness, swelling, or redness.   Integumentary: no rashes or skin lesions present  Neurologic: cranial nerves intact, no signs of peripheral neurological deficit, motor/sensory function intact         Assessment and Discussion:       Encounter Diagnoses   Name Primary?    Atypical ductal hyperplasia of left breast Yes    Comment: found on excisional biopsy 4/22/2024     Intraductal papilloma of both breasts     Comment: s/p resection 4/22/2024     Atypical lobular hyperplasia (ALH) of left breast     Comment: found on excisional biopsy 4/22/2024         Atypical ductal hyperplasia (ADH) is not a form of breast cancer, but is a marker or risk factor for developing breast cancer in the " future. Women with atypical hyperplasia have a lifetime risk of breast cancer that is about four times higher than that of women who don't have atypical hyperplasia:     At 5 years after diagnosis, about 7 percent of women with atypical hyperplasia may develop breast cancer.   At 10 years after diagnosis, about 13 percent of women with atypical hyperplasia may develop breast cancer.   At 25 years after diagnosis, about 30 percent of women with atypical hyperplasia may develop breast cancer.     Preventative treatment with medications such as tamoxifen or raloxifene (Evista), or aromatase inhibitors, such as exemestane (Aromasin) and anastrozole (Arimidex) for five years may reduce the risk of breast cancer.          Plan:       BL DG MG at [facility] in 6 months.    RTC after MG for high risk follow up.    Referral to Medical Oncology to consider risk reduction medication. Advise she stop taking Premarin. Discussed non-hormonal options for hot flash management such as Effexor or Gabapentin, which she will discuss further with oncologist.     Post op instructions discussed including gradual return to activities/lifting as tolerated. Discussed tentative return to work May 13th.    Healthy lifestyle guidelines were reviewed. She was encouraged to engage in regular exercise, maintain a healthy body weight, and avoid excessive alcohol consumption. Healthy nutritional guidelines were also discussed. Self-breast examination was reviewed with the patient in detail and she was encouraged to perform this on a monthly basis.     Patient expressed she has been feeling down and anxious regarding concerns about risk for developing breast cancer. I discussed this may be transition anxiety which may improve in the next week or two. I discussed that if symptoms worsen or do not improve in the next 2 weeks, I would recommend follow up with her PCP. May also consider counseling/CBT. She is in agreement with this plan.      All of her  questions were answered. She was advised to call if she develops any questions or concerns.    Abida Pires PA-C

## 2024-04-30 ENCOUNTER — OFFICE VISIT (OUTPATIENT)
Dept: SURGERY | Facility: CLINIC | Age: 54
End: 2024-04-30
Payer: COMMERCIAL

## 2024-04-30 VITALS
SYSTOLIC BLOOD PRESSURE: 158 MMHG | HEIGHT: 62 IN | DIASTOLIC BLOOD PRESSURE: 73 MMHG | HEART RATE: 76 BPM | RESPIRATION RATE: 20 BRPM | TEMPERATURE: 98 F | BODY MASS INDEX: 33.68 KG/M2 | WEIGHT: 183 LBS | OXYGEN SATURATION: 95 %

## 2024-04-30 DIAGNOSIS — N60.92 ATYPICAL DUCTAL HYPERPLASIA OF LEFT BREAST: ICD-10-CM

## 2024-04-30 DIAGNOSIS — N60.92 ATYPICAL LOBULAR HYPERPLASIA (ALH) OF LEFT BREAST: ICD-10-CM

## 2024-04-30 DIAGNOSIS — D24.1 INTRADUCTAL PAPILLOMA OF BOTH BREASTS: ICD-10-CM

## 2024-04-30 DIAGNOSIS — Z76.89 ENCOUNTER TO ESTABLISH CARE WITH NEW DOCTOR: Primary | ICD-10-CM

## 2024-04-30 DIAGNOSIS — D24.2 INTRADUCTAL PAPILLOMA OF BOTH BREASTS: ICD-10-CM

## 2024-04-30 PROCEDURE — 99999 PR PBB SHADOW E&M-EST. PATIENT-LVL IV: CPT | Mod: PBBFAC,,, | Performed by: PHYSICIAN ASSISTANT

## 2024-04-30 PROCEDURE — 1159F MED LIST DOCD IN RCRD: CPT | Mod: CPTII,S$GLB,, | Performed by: PHYSICIAN ASSISTANT

## 2024-04-30 PROCEDURE — 1160F RVW MEDS BY RX/DR IN RCRD: CPT | Mod: CPTII,S$GLB,, | Performed by: PHYSICIAN ASSISTANT

## 2024-04-30 PROCEDURE — 99024 POSTOP FOLLOW-UP VISIT: CPT | Mod: S$GLB,,, | Performed by: PHYSICIAN ASSISTANT

## 2024-04-30 PROCEDURE — 3077F SYST BP >= 140 MM HG: CPT | Mod: CPTII,S$GLB,, | Performed by: PHYSICIAN ASSISTANT

## 2024-04-30 PROCEDURE — 3078F DIAST BP <80 MM HG: CPT | Mod: CPTII,S$GLB,, | Performed by: PHYSICIAN ASSISTANT

## 2024-04-30 RX ORDER — NAPROXEN 500 MG/1
500 TABLET ORAL EVERY 8 HOURS PRN
COMMUNITY
Start: 2024-03-20 | End: 2024-05-28

## 2024-05-01 ENCOUNTER — TELEPHONE (OUTPATIENT)
Dept: URGENT CARE | Facility: CLINIC | Age: 54
End: 2024-05-01
Payer: COMMERCIAL

## 2024-05-01 NOTE — TELEPHONE ENCOUNTER
Left message and call back number with the phone staff for Dr. Bobbi Sierra  office.     Phone number call [phone]     Message regarding a Referral for MRI, for patient work related injury.      Destiny Richter   Workers Comp Patient  Coordinator  [facility]  [facility] ([phone])    [facility]-[place]*[facility]  [phone] Direct Line  [phone] Fax

## 2024-05-03 NOTE — PROGRESS NOTES
Subjective:       Patient ID: Katherin Johnson is a 54 y.o. female.    Chief Complaint: New Patient    Diagnosis:   Left Breast Atypical Ductal Hyperplasia  Left Breast Atypical Lobular Hyperplasia    Current Treatment: None    Treatment History:   Bilateral excisional biopsy - 4/22/2024 - Dr. Breaux    HPI:   53 yo F presents in May '24 for evaluation of atypical ductal and lobular hyperplasia. She initially presented with R nipple discharge in late 2023. She had diagnostic breast imaging in February which revealed a 0.7 x 0.3 x 0.8 cm oval hypoechoic mass in the R breast at 5 o'clock subareolar position as well as a 0.6 x 0.4 x 0.5 cm oval mass in the left breast at 3 o'clock 2 CMFN. She underwent biopsy of both lesions which both revealed sclerosing intraductal papilloma. She underwent bilateral excisional biopsy of both lesions with Dr. Breaux on 4/22/24. Final pathology revealed left intraductal papilloma, ALH and ADH. In the R breast was found intraductal papilloma. No evidence of invasive disease in either breast. She presents to medical oncology for evaluation.     She is healing well from surgery. She has no complaints today.     I discussed her diagnosis and increased risk of invasive breast cancer. I explained the different ways of risk reduction including with 5 years of antihormonal therapy. She is agreeable to proceed.     The mechanism, benefits and side effect profile of Letrozole was reviewed with patient. The benefits described include reduced risk of contralateral breast cancer and reduced risk of distant metastatic disease. Side effects discussed include joint pain, hot flashes, vaginal dryness, arthralgias and loss of bone mineral density. The need for DEXA scan to monitor bone mineral density as well as supplemental calcium and vitamin D were discussed.     Past Medical History:   Diagnosis Date    Anxiety     Breast lump or mass     bilat    Insomnia       Past Surgical History:   Procedure  Laterality Date     SECTION      I have 4 :  ,,,    COSMETIC SURGERY  2018    Had a tummy tuck    EXCISION, MASS, BREAST, USING RADIOLOGICAL MARKER Bilateral 2024    Procedure: EXCISION,MASS,BREAST,USING RADIOLOGICAL MARKER - [employer] Localization - BILATERAL Need Sentimag Need Faxitron Need sterile charms;  Surgeon: Myranda Breaux MD;  Location: [facility] OR;  Service: General;  Laterality: Bilateral;  Need Sentimag  Need Faxitron  Need sterile charms    HYSTERECTOMY  2015    Had an hysterectomy in that year    OOPHORECTOMY  2016    When i bailee my hysterectomy    SPINE SURGERY  2012    (Neck)Dr.Jason Mckeon     Social History     Socioeconomic History    Marital status:    Tobacco Use    Smoking status: Never    Smokeless tobacco: Never    Tobacco comments:     N/A   Substance and Sexual Activity    Alcohol use: Never    Drug use: Never    Sexual activity: Not Currently     Partners: Male     Birth control/protection: None      Family History   Problem Relation Name Age of Onset    Breast cancer Paternal Grandmother Karoline Mckee 80    Heart failure Mother Sarah Johnson         Triple bypass    Heart disease Mother Sarah Johnson     Cancer Father Greta Muronez Sr.         Throat cancer      Review of patient's allergies indicates:  No Known Allergies   Review of Systems   Constitutional:  Negative for activity change, fever and unexpected weight change.   HENT:  Negative for sore throat.    Eyes:  Negative for visual disturbance.   Respiratory:  Negative for cough and shortness of breath.    Cardiovascular:  Negative for chest pain.   Gastrointestinal:  Negative for abdominal pain, diarrhea, nausea and vomiting.   Endocrine: Negative for polyuria.   Genitourinary:  Negative for dysuria and hot flashes.   Integumentary:  Negative for rash.   Neurological:  Negative for weakness and headaches.   Hematological:  Negative for adenopathy.   Psychiatric/Behavioral:  Negative  for confusion.          Objective:      Vitals:    24 1411   BP: (!) 146/88   Pulse: 65   Resp: 16   Temp: 98.1 °F (36.7 °C)       Physical Exam  Constitutional:       General: She is not in acute distress.     Appearance: Normal appearance. She is not ill-appearing.   HENT:      Head: Normocephalic and atraumatic.      Nose: Nose normal.      Mouth/Throat:      Mouth: Mucous membranes are moist.      Pharynx: Oropharynx is clear.   Eyes:      Extraocular Movements: Extraocular movements intact.      Conjunctiva/sclera: Conjunctivae normal.      Pupils: Pupils are equal, round, and reactive to light.   Cardiovascular:      Rate and Rhythm: Normal rate and regular rhythm.      Pulses: Normal pulses.      Heart sounds: Normal heart sounds. No murmur heard.  Pulmonary:      Effort: Pulmonary effort is normal. No respiratory distress.      Breath sounds: Normal breath sounds.   Abdominal:      General: There is no distension.      Palpations: Abdomen is soft.      Tenderness: There is no abdominal tenderness.   Musculoskeletal:         General: Normal range of motion.      Cervical back: Normal range of motion and neck supple.      Right lower leg: No edema.      Left lower leg: No edema.   Lymphadenopathy:      Cervical: No cervical adenopathy.   Skin:     General: Skin is warm and dry.   Neurological:      General: No focal deficit present.      Mental Status: She is alert and oriented to person, place, and time.         LABS AND IMAGING REVIEWED IN EPIC    Imagin2024  BL DG MG/BL US BREAST LIMITED at [facility]-  IMPRESSION: BENIGN, ULTRASOUND SUSPICIOUS OF MALIGNANCY   A) R breast 5 o'clock subareolar 0.7 x 0.3 x 0.8 cm oval hypoechoic circumscribed vascular intraductal mass within a mildly dilated ducts correlates with the patient's history of right nipple discharge and is suspicious.  BI-RADS 4: Suspicious.    B) L breast 3 o'clock  2 cm FN 0.6 x 0.4 x 0.5 cm oval nonparallel hypoechoic circumscribed  vascular mass was incidentally discovered and is suspicious.  BI-RADS 4: Suspicious.  C) No mammographic evidence of malignancy in either breast.     Pathology:  3/5/2024  Ultrasound-guided Core Needle Biopsy Left Breast 3 o'clock 2 cm FN-  - Sclerosing intraductal papilloma(measuring 3.5 mm) with superimposed organizing hemorrhage  - Focal moderate to florid ductal epithelial hyperplasia of the usual type     3/5/2024  Ultrasound-guided Core Needle Biopsy Right Breast 5 o'clock SA-  - Sclerosing intraductal papilloma (measuring 2.5 mm)  - No evidence of malignancy    4/22/2024 Bilateral excisional breast biopsy  Left - intraductal papilloma, focal ADH, ALH. Proliferative fibrocystic changes. Site of prior biopsy. Negative for in situ or invasive carcinoma.  Right - intraductal papilloma, partially sclerosed. Proliferative fibrocystic changes. Site of prior biopsy. Calcifications within benign lobules. Negative for invasive or in situ carcinoma.    Assessment:   Left Breast Atypical Ductal Hyperplasia - In addition to increased screening and lifestyle changes, she has agreed to proceed with antihormonal therapy for risk reduction. Will plan for 5 years of AI therapy.         Plan:   - Discussed risks and benefits of Anastrozole due to ADH  - Dexa scan ordered today for long term use of AI to be completed prior to next OV  - Effexor sent to pharmacy today for management of hot flashes   - RTC 6 weeks for NP visit, labs same day    I spent a total of 60 minutes on the day of the visit.This includes face to face time and non-face to face time preparing to see the patient (eg, review of tests), obtaining and/or reviewing separately obtained history, documenting clinical information in the electronic or other health record, independently interpreting results and communicating results to the patient/family/caregiver, or care coordinator.      Elizabeth Kennedy LeJeune, MD  Hematology/Oncology   [facility]        Professional Services   I, Flower Cisse LPN, acted solely as a scribe for and in the presence of Dr. Elizabeth Kennedy LeJeune, who performed these services.

## 2024-05-07 ENCOUNTER — OFFICE VISIT (OUTPATIENT)
Dept: HEMATOLOGY/ONCOLOGY | Facility: CLINIC | Age: 54
End: 2024-05-07
Payer: COMMERCIAL

## 2024-05-07 VITALS
SYSTOLIC BLOOD PRESSURE: 146 MMHG | HEART RATE: 65 BPM | BODY MASS INDEX: 33.36 KG/M2 | HEIGHT: 62 IN | OXYGEN SATURATION: 96 % | DIASTOLIC BLOOD PRESSURE: 88 MMHG | TEMPERATURE: 98 F | WEIGHT: 181.31 LBS | RESPIRATION RATE: 16 BRPM

## 2024-05-07 DIAGNOSIS — N60.92 ATYPICAL DUCTAL HYPERPLASIA OF LEFT BREAST: ICD-10-CM

## 2024-05-07 DIAGNOSIS — Z76.89 ENCOUNTER TO ESTABLISH CARE WITH NEW DOCTOR: ICD-10-CM

## 2024-05-07 DIAGNOSIS — N60.92 ATYPICAL LOBULAR HYPERPLASIA (ALH) OF LEFT BREAST: ICD-10-CM

## 2024-05-07 DIAGNOSIS — N95.1 HOT FLASHES DUE TO MENOPAUSE: Primary | ICD-10-CM

## 2024-05-07 DIAGNOSIS — Z79.811 LONG TERM (CURRENT) USE OF AROMATASE INHIBITORS: ICD-10-CM

## 2024-05-07 LAB
ALBUMIN SERPL-MCNC: 3.4 G/DL (ref 3.5–5)
ALBUMIN/GLOB SERPL: 0.9 RATIO (ref 1.1–2)
ALP SERPL-CCNC: 87 UNIT/L (ref 40–150)
ALT SERPL-CCNC: 16 UNIT/L (ref 0–55)
AST SERPL-CCNC: 18 UNIT/L (ref 5–34)
BASOPHILS # BLD AUTO: 0.03 X10(3)/MCL
BASOPHILS NFR BLD AUTO: 0.6 %
BILIRUB SERPL-MCNC: 0.2 MG/DL
BUN SERPL-MCNC: 10.5 MG/DL (ref 9.8–20.1)
CALCIUM SERPL-MCNC: 9.3 MG/DL (ref 8.4–10.2)
CHLORIDE SERPL-SCNC: 105 MMOL/L (ref 98–107)
CO2 SERPL-SCNC: 29 MMOL/L (ref 22–29)
CREAT SERPL-MCNC: 0.79 MG/DL (ref 0.55–1.02)
EOSINOPHIL # BLD AUTO: 0.23 X10(3)/MCL (ref 0–0.9)
EOSINOPHIL NFR BLD AUTO: 4.7 %
ERYTHROCYTE [DISTWIDTH] IN BLOOD BY AUTOMATED COUNT: 13.3 % (ref 11.5–17)
GFR SERPLBLD CREATININE-BSD FMLA CKD-EPI: >60 ML/MIN/1.73/M2
GLOBULIN SER-MCNC: 3.7 GM/DL (ref 2.4–3.5)
GLUCOSE SERPL-MCNC: 103 MG/DL (ref 74–100)
HCT VFR BLD AUTO: 43 % (ref 37–47)
HGB BLD-MCNC: 14 G/DL (ref 12–16)
IMM GRANULOCYTES # BLD AUTO: 0.01 X10(3)/MCL (ref 0–0.04)
IMM GRANULOCYTES NFR BLD AUTO: 0.2 %
LYMPHOCYTES # BLD AUTO: 2.38 X10(3)/MCL (ref 0.6–4.6)
LYMPHOCYTES NFR BLD AUTO: 48.7 %
MCH RBC QN AUTO: 27.8 PG (ref 27–31)
MCHC RBC AUTO-ENTMCNC: 32.6 G/DL (ref 33–36)
MCV RBC AUTO: 85.5 FL (ref 80–94)
MONOCYTES # BLD AUTO: 0.62 X10(3)/MCL (ref 0.1–1.3)
MONOCYTES NFR BLD AUTO: 12.7 %
NEUTROPHILS # BLD AUTO: 1.62 X10(3)/MCL (ref 2.1–9.2)
NEUTROPHILS NFR BLD AUTO: 33.1 %
PLATELET # BLD AUTO: 282 X10(3)/MCL (ref 130–400)
PMV BLD AUTO: 9.6 FL (ref 7.4–10.4)
POTASSIUM SERPL-SCNC: 3.8 MMOL/L (ref 3.5–5.1)
PROT SERPL-MCNC: 7.1 GM/DL (ref 6.4–8.3)
RBC # BLD AUTO: 5.03 X10(6)/MCL (ref 4.2–5.4)
SODIUM SERPL-SCNC: 139 MMOL/L (ref 136–145)
WBC # SPEC AUTO: 4.89 X10(3)/MCL (ref 4.5–11.5)

## 2024-05-07 PROCEDURE — 3077F SYST BP >= 140 MM HG: CPT | Mod: CPTII,S$GLB,, | Performed by: STUDENT IN AN ORGANIZED HEALTH CARE EDUCATION/TRAINING PROGRAM

## 2024-05-07 PROCEDURE — 1159F MED LIST DOCD IN RCRD: CPT | Mod: CPTII,S$GLB,, | Performed by: STUDENT IN AN ORGANIZED HEALTH CARE EDUCATION/TRAINING PROGRAM

## 2024-05-07 PROCEDURE — 80053 COMPREHEN METABOLIC PANEL: CPT | Performed by: STUDENT IN AN ORGANIZED HEALTH CARE EDUCATION/TRAINING PROGRAM

## 2024-05-07 PROCEDURE — 99999 PR PBB SHADOW E&M-EST. PATIENT-LVL IV: CPT | Mod: PBBFAC,,, | Performed by: STUDENT IN AN ORGANIZED HEALTH CARE EDUCATION/TRAINING PROGRAM

## 2024-05-07 PROCEDURE — 99205 OFFICE O/P NEW HI 60 MIN: CPT | Mod: S$GLB,,, | Performed by: STUDENT IN AN ORGANIZED HEALTH CARE EDUCATION/TRAINING PROGRAM

## 2024-05-07 PROCEDURE — 85025 COMPLETE CBC W/AUTO DIFF WBC: CPT | Performed by: STUDENT IN AN ORGANIZED HEALTH CARE EDUCATION/TRAINING PROGRAM

## 2024-05-07 PROCEDURE — 3079F DIAST BP 80-89 MM HG: CPT | Mod: CPTII,S$GLB,, | Performed by: STUDENT IN AN ORGANIZED HEALTH CARE EDUCATION/TRAINING PROGRAM

## 2024-05-07 PROCEDURE — 36415 COLL VENOUS BLD VENIPUNCTURE: CPT | Performed by: STUDENT IN AN ORGANIZED HEALTH CARE EDUCATION/TRAINING PROGRAM

## 2024-05-07 PROCEDURE — 1160F RVW MEDS BY RX/DR IN RCRD: CPT | Mod: CPTII,S$GLB,, | Performed by: STUDENT IN AN ORGANIZED HEALTH CARE EDUCATION/TRAINING PROGRAM

## 2024-05-07 PROCEDURE — 3008F BODY MASS INDEX DOCD: CPT | Mod: CPTII,S$GLB,, | Performed by: STUDENT IN AN ORGANIZED HEALTH CARE EDUCATION/TRAINING PROGRAM

## 2024-05-07 RX ORDER — VENLAFAXINE HYDROCHLORIDE 37.5 MG/1
37.5 CAPSULE, EXTENDED RELEASE ORAL DAILY
Qty: 30 CAPSULE | Refills: 2 | Status: SHIPPED | OUTPATIENT
Start: 2024-05-07 | End: 2024-06-18 | Stop reason: SDUPTHER

## 2024-05-07 RX ORDER — ANASTROZOLE 1 MG/1
1 TABLET ORAL DAILY
Qty: 30 TABLET | Refills: 2 | Status: SHIPPED | OUTPATIENT
Start: 2024-05-07 | End: 2024-06-06 | Stop reason: SDUPTHER

## 2024-05-10 DIAGNOSIS — M65.229: ICD-10-CM

## 2024-05-10 DIAGNOSIS — M25.512 LEFT SHOULDER PAIN, UNSPECIFIED CHRONICITY: Primary | ICD-10-CM

## 2024-05-11 ENCOUNTER — HOSPITAL ENCOUNTER (OUTPATIENT)
Dept: RADIOLOGY | Facility: HOSPITAL | Age: 54
Discharge: HOME | End: 2024-05-11
Payer: COMMERCIAL

## 2024-05-11 DIAGNOSIS — M25.512 LEFT SHOULDER PAIN, UNSPECIFIED CHRONICITY: ICD-10-CM

## 2024-05-11 DIAGNOSIS — M65.229: ICD-10-CM

## 2024-05-11 PROCEDURE — 73223 MRI JOINT UPR EXTR W/O&W/DYE: CPT | Mod: TC,LT

## 2024-05-11 PROCEDURE — 25500020 PHARM REV CODE 255

## 2024-05-11 PROCEDURE — A9577 INJ MULTIHANCE: HCPCS

## 2024-05-11 RX ADMIN — GADOBENATE DIMEGLUMINE 16 ML: 529 INJECTION, SOLUTION INTRAVENOUS at 09:05

## 2024-05-28 ENCOUNTER — OFFICE VISIT (OUTPATIENT)
Dept: ORTHOPEDICS | Facility: CLINIC | Age: 54
End: 2024-05-28
Payer: COMMERCIAL

## 2024-05-28 VITALS
DIASTOLIC BLOOD PRESSURE: 81 MMHG | BODY MASS INDEX: 33.56 KG/M2 | SYSTOLIC BLOOD PRESSURE: 153 MMHG | WEIGHT: 182.38 LBS | HEIGHT: 62 IN | HEART RATE: 81 BPM

## 2024-05-28 DIAGNOSIS — S46.012A TRAUMATIC INCOMPLETE TEAR OF LEFT ROTATOR CUFF, INITIAL ENCOUNTER: Primary | ICD-10-CM

## 2024-05-28 DIAGNOSIS — M75.32 CALCIFIC TENDONITIS OF LEFT SHOULDER: ICD-10-CM

## 2024-05-28 DIAGNOSIS — Z02.6 ENCOUNTER RELATED TO WORKER'S COMPENSATION CLAIM: ICD-10-CM

## 2024-05-28 PROCEDURE — 99204 OFFICE O/P NEW MOD 45 MIN: CPT | Mod: 25,,, | Performed by: ORTHOPAEDIC SURGERY

## 2024-05-28 PROCEDURE — 20610 DRAIN/INJ JOINT/BURSA W/O US: CPT | Mod: LT,,, | Performed by: ORTHOPAEDIC SURGERY

## 2024-05-28 RX ORDER — LIDOCAINE HYDROCHLORIDE 20 MG/ML
2 INJECTION, SOLUTION INFILTRATION; PERINEURAL
Status: DISCONTINUED | OUTPATIENT
Start: 2024-05-28 | End: 2024-05-28 | Stop reason: HOSPADM

## 2024-05-28 RX ORDER — METHYLPREDNISOLONE ACETATE 80 MG/ML
40 INJECTION, SUSPENSION INTRA-ARTICULAR; INTRALESIONAL; INTRAMUSCULAR; SOFT TISSUE
Status: DISCONTINUED | OUTPATIENT
Start: 2024-05-28 | End: 2024-05-28 | Stop reason: HOSPADM

## 2024-05-28 RX ADMIN — LIDOCAINE HYDROCHLORIDE 2 ML: 20 INJECTION, SOLUTION INFILTRATION; PERINEURAL at 01:05

## 2024-05-28 RX ADMIN — METHYLPREDNISOLONE ACETATE 40 MG: 80 INJECTION, SUSPENSION INTRA-ARTICULAR; INTRALESIONAL; INTRAMUSCULAR; SOFT TISSUE at 01:05

## 2024-05-28 NOTE — LETTER
May 28, 2024       [facility]  [address], [address]  [place] [address]  Phone: [phone]  Fax: [phone]       Patient: Katherin Johnson   YOB: 1970  Date of Visit: 05/28/2024    To Whom It May Concern:    Dinah Johnson  was at [facility] on 05/28/2024. The patient may return to work of light duty with restrictions of no heavy lifting and will start PT the week of 6/3/24 and will return to our office on 7/23/24. If you have any questions or concerns, or if I can be of further assistance, please do not hesitate to contact me.    Sincerely,    Dr Bernabe Pack MD

## 2024-05-28 NOTE — PROCEDURES
Large Joint Aspiration/Injection: L subacromial bursa    Date/Time: 5/28/2024 1:45 PM    Performed by: Bernabe Pack MD  Authorized by: Bernabe Pack MD    Consent Done?:  Yes (Verbal)  Indications:  Pain  Site marked: the procedure site was marked    Timeout: prior to procedure the correct patient, procedure, and site was verified    Prep: patient was prepped and draped in usual sterile fashion    Approach:  Posterior  Location:  Shoulder  Site:  L subacromial bursa  Medications:  2 mL LIDOcaine HCL 20 mg/ml (2%) 20 mg/mL (2 %); 40 mg methylPREDNISolone acetate 80 mg/mL  Patient tolerance:  Patient tolerated the procedure well with no immediate complications

## 2024-05-28 NOTE — PROGRESS NOTES
[facility]  Orthopedic Clinic Note      Chief Complaint:   Chief Complaint   Patient presents with    Appointment     Patient here today for left shoulder pain since 24 after lifting a heavy rep pan at work. She c/o stiffness, pain with all movement, and unable to lift arm past shoulder. She has tried Ice, heat, oral nsaids. None of those helped with the pain for longer than a few minutes.      Referring Physician: No ref. provider found      History of Present Illness:    This is a 54 y.o. year old female presenting with complaints of left shoulder pain since an injury that occurred on 2024.  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  She states that she lifted a heavy instrument pan overhead that resulted in severe pain in her left shoulder.  This shoulder pain is moderate to severe. Patient reports increased pain with overhead movement.  Patient reports night pain.  Patient reports anterolateral shoulder pain that radiates down the arm.  She was evaluated by her PCP for the same complaint who ordered x-ray imaging and MRI of the left shoulder.  MRI of the left shoulder demonstrated bulky calcific tendonitis with intermediate grade partial-thickness tearing of the rotator cuff.  She has been applying heat and ice, as well as taking over-the-counter Tylenol and anti-inflammatories with no improvement in her symptoms.      Past Medical History:   Diagnosis Date    Anxiety     Breast lump or mass     bilat    Insomnia        Past Surgical History:   Procedure Laterality Date     SECTION      I have 4 :  ,,,    COSMETIC SURGERY  2018    Had a tummy tuck    EXCISION, MASS, BREAST, USING RADIOLOGICAL MARKER Bilateral 2024    Procedure: EXCISION,MASS,BREAST,USING RADIOLOGICAL MARKER - [employer] Localization - BILATERAL Need Sentimag Need Faxitron Need sterile charms;  Surgeon: Myranda Breaux MD;  Location: [facility];  Service: General;   "Laterality: Bilateral;  Need Sentimag  Need Faxitron  Need sterile charms    HYSTERECTOMY  2015    Had an hysterectomy in that year    OOPHORECTOMY  2016    When i bailee my hysterectomy    SPINE SURGERY  2012    (Neck)Dr.Jason Mckeon       Current Outpatient Medications   Medication Sig    anastrozole (ARIMIDEX) 1 mg Tab Take 1 tablet (1 mg total) by mouth once daily.    dextroamphetamine-amphetamine (ADDERALL) 20 mg tablet Take 1 tablet by mouth 2 (two) times daily.    EScitalopram oxalate (LEXAPRO) 20 MG tablet Take 20 mg by mouth every evening.    indomethacin (INDOCIN) 50 MG capsule Take 1 capsule (50 mg total) by mouth 3 (three) times daily with meals.    venlafaxine (EFFEXOR-XR) 37.5 MG 24 hr capsule Take 1 capsule (37.5 mg total) by mouth once daily.    zolpidem (AMBIEN) 10 mg Tab Take 10 mg by mouth every evening.     No current facility-administered medications for this visit.       Review of patient's allergies indicates:  No Known Allergies    Family History   Problem Relation Name Age of Onset    Breast cancer Paternal Grandmother Karoline Sandra    Heart failure Mother Sarah Johnson         Triple bypass    Heart disease Mother Sarah Johnson     Cancer Father Greta Johnson Sr.         Throat cancer       Social History     Socioeconomic History    Marital status:    Tobacco Use    Smoking status: Never    Smokeless tobacco: Never    Tobacco comments:     N/A   Substance and Sexual Activity    Alcohol use: Never    Drug use: Never    Sexual activity: Not Currently     Partners: Male     Birth control/protection: None           Review of Systems:  All review of systems negative except for those stated in the HPI.    Examination:    Vital Signs:    Vitals:    05/28/24 1351   BP: (!) 153/81   Pulse: 81   Weight: 82.7 kg (182 lb 6.4 oz)   Height: 5' 2" (1.575 m)       Body mass index is 33.36 kg/m².    Physical Examination:  General: Well-developed, well-nourished.  Neuro: Alert and oriented x 3.  Psych: " Normal mood and affect.  Card: Regular rate and rhythm  Resp: Respirations regular and unlabored  Left Shoulder Exam:  No obvious deformity. No medial or lateral scapula winging. Forward flexion to 140 degrees and abduction to 140 degrees. Positive empty can, positive Whipple, Positive drop arm test, Andujar, impingement, Positive AC joint tenderness. Negative biceps groove tenderness, Positive Mohamud´s, Yergason´s, Speed test. Negative Apprehension and Relocation test. 4/5 strength, normal skin appearance and palpable pulses distally. Sensibility normal.      Imaging: Prior four views of the left shoulder demonstrate evidence of calcific tendonitis.    Assessment: Traumatic incomplete tear of left rotator cuff, initial encounter  -     Ambulatory referral/consult to Physical/Occupational Therapy; Future; Expected date: 06/04/2024  -     Large Joint Aspiration/Injection: L subacromial bursa    Calcific tendonitis of left shoulder  -     Ambulatory referral/consult to Physical/Occupational Therapy; Future; Expected date: 06/04/2024  -     Large Joint Aspiration/Injection: L subacromial bursa    Encounter related to worker's compensation claim  -     Ambulatory referral/consult to Physical/Occupational Therapy; Future; Expected date: 06/04/2024  -     Large Joint Aspiration/Injection: L subacromial bursa        Plan:  Prior imaging reviewed with the patient.  Plan to proceed conservatively with left subacromial corticosteroid injection.  Referral entered for formal physical therapy.  Over-the-counter medications as needed for pain.  She will remain on light duty for now with no heavy lifting.  She will return to clinic in approximately 6-8 weeks for re-evaluation.  She verbalized understanding of the plan of care with no further questions.    Bernbae Pack MD personally performed the services described in this documentation, including but not limited to patient's history, physical examination, and assessment and plan of  care. All medical record entries made by CARMEN Youngblood were performed at his direction and in his presence. The medical record was reviewed and is accurate and complete.      Large Joint Aspiration/Injection: L subacromial bursa    Date/Time: 5/28/2024 1:45 PM    Performed by: Bernabe Pack MD  Authorized by: Bernabe Pack MD    Consent Done?:  Yes (Verbal)  Indications:  Pain  Site marked: the procedure site was marked    Timeout: prior to procedure the correct patient, procedure, and site was verified    Prep: patient was prepped and draped in usual sterile fashion    Approach:  Posterior  Location:  Shoulder  Site:  L subacromial bursa  Medications:  2 mL LIDOcaine HCL 20 mg/ml (2%) 20 mg/mL (2 %); 40 mg methylPREDNISolone acetate 80 mg/mL  Patient tolerance:  Patient tolerated the procedure well with no immediate complications      Follow up in about 6 weeks (around 7/9/2024) for Reevaluation.      DISCLAIMER: This note may have been dictated using voice recognition software and may contain grammatical errors.     NOTE: Consult report sent to referring provider via [employer] EMR.

## 2024-06-06 DIAGNOSIS — N60.92 ATYPICAL DUCTAL HYPERPLASIA OF LEFT BREAST: ICD-10-CM

## 2024-06-06 DIAGNOSIS — N60.92 ATYPICAL LOBULAR HYPERPLASIA (ALH) OF LEFT BREAST: ICD-10-CM

## 2024-06-06 RX ORDER — ANASTROZOLE 1 MG/1
1 TABLET ORAL DAILY
Qty: 30 TABLET | Refills: 0 | Status: SHIPPED | OUTPATIENT
Start: 2024-06-06 | End: 2024-07-06

## 2024-06-10 ENCOUNTER — HOSPITAL ENCOUNTER (OUTPATIENT)
Dept: RADIOLOGY | Facility: HOSPITAL | Age: 54
Discharge: HOME | End: 2024-06-10
Attending: STUDENT IN AN ORGANIZED HEALTH CARE EDUCATION/TRAINING PROGRAM
Payer: COMMERCIAL

## 2024-06-10 DIAGNOSIS — Z79.811 LONG TERM (CURRENT) USE OF AROMATASE INHIBITORS: ICD-10-CM

## 2024-06-10 PROCEDURE — 77080 DXA BONE DENSITY AXIAL: CPT | Mod: TC

## 2024-06-10 PROCEDURE — 77080 DXA BONE DENSITY AXIAL: CPT | Mod: 26,,, | Performed by: RADIOLOGY

## 2024-06-17 NOTE — PROGRESS NOTES
Subjective:       Patient ID: Katherin Johnson is a 54 y.o. female.    Chief Complaint: Follow up    Diagnosis:   Left Breast Atypical Ductal Hyperplasia  Left Breast Atypical Lobular Hyperplasia    Current Treatment:   Anastrozole 1 mg po daily 5/3/24- present  Prolia; insurance pending approval    Treatment History:   Bilateral excisional biopsy - 4/22/2024 - Dr. Breaux    HPI:   53 yo F presents in May '24 for evaluation of atypical ductal and lobular hyperplasia. She initially presented with R nipple discharge in late 2023. She had diagnostic breast imaging in February which revealed a 0.7 x 0.3 x 0.8 cm oval hypoechoic mass in the R breast at 5 o'clock subareolar position as well as a 0.6 x 0.4 x 0.5 cm oval mass in the left breast at 3 o'clock 2 CMFN. She underwent biopsy of both lesions which both revealed sclerosing intraductal papilloma. She underwent bilateral excisional biopsy of both lesions with Dr. Breaux on 4/22/24. Final pathology revealed left intraductal papilloma, ALH and ADH. In the R breast was found intraductal papilloma. No evidence of invasive disease in either breast. She presents to medical oncology for evaluation.     Her diagnosis and increased risk of invasive breast cancer was discussed.  The different ways of risk reduction including with 5 years of antihormonal therapy was explained. She agreed to proceed.     The mechanism, benefits and side effect profile of Anastrozole was reviewed with patient. The benefits described include reduced risk of contralateral breast cancer and reduced risk of distant metastatic disease. Side effects discussed include joint pain, hot flashes, vaginal dryness, arthralgias and loss of bone mineral density. Osteopenia on most recent DEXA. Will plan for Prolia pending dental clearance and insurance approval. Will continue calcium and vitamin D supplements as well.      Interval History:   Patient here today for 6 week NP follow up appointment and toxicity  check while on AI therapy.  Overall she feels well.  Reports have been feeling depressed/ decline in her mood.  She attributes this feeling to her increased risks of invasive breast cancer and current treatment  She has had hot flashes and mild joint pains  She is currently taking Effexor with minimal improvement.  Discussed labs and Prolia in detail, its role, potential toxicities and side effects. She is verbalized understanding and is agreeable to proceed.   Appetite and energy levels are stable.  Otherwise no major issues or concerns.     Past Medical History:   Diagnosis Date    Anxiety     Breast lump or mass     bilat    Insomnia       Past Surgical History:   Procedure Laterality Date     SECTION      I have 4 :  ,,,    COSMETIC SURGERY  2018    Had a tummy tuck    EXCISION, MASS, BREAST, USING RADIOLOGICAL MARKER Bilateral 2024    Procedure: EXCISION,MASS,BREAST,USING RADIOLOGICAL MARKER - [employer] Localization - BILATERAL Need Sentimag Need Faxitron Need sterile charms;  Surgeon: Myranda Breaux MD;  Location: [facility] OR;  Service: General;  Laterality: Bilateral;  Need Sentimag  Need Faxitron  Need sterile charms    HYSTERECTOMY  2015    Had an hysterectomy in that year    OOPHORECTOMY  2016    When i bailee my hysterectomy    SPINE SURGERY  2012    (Neck)Dr.Jason Mckeon     Social History     Socioeconomic History    Marital status:    Tobacco Use    Smoking status: Never    Smokeless tobacco: Never    Tobacco comments:     N/A   Substance and Sexual Activity    Alcohol use: Never    Drug use: Never    Sexual activity: Not Currently     Partners: Male     Birth control/protection: None      Family History   Problem Relation Name Age of Onset    Breast cancer Paternal Grandmother Karolnie Small 80    Heart failure Mother Sarah Johnson         Triple bypass    Heart disease Mother Sarah Johnson     Cancer Father Greta Johnson Sr.         Throat cancer      Review  of patient's allergies indicates:  No Known Allergies   Review of Systems   Constitutional:  Positive for appetite change. Negative for activity change, fever and unexpected weight change.   HENT:  Negative for mouth sores and sore throat.    Eyes:  Negative for visual disturbance.   Respiratory:  Negative for cough and shortness of breath.    Cardiovascular:  Negative for chest pain.   Gastrointestinal:  Negative for abdominal pain, diarrhea, nausea and vomiting.   Endocrine: Negative for polyuria.   Genitourinary:  Negative for dysuria, frequency and hot flashes.   Musculoskeletal:  Negative for back pain.   Integumentary:  Negative for rash.   Neurological:  Negative for weakness and headaches.   Hematological:  Negative for adenopathy.   Psychiatric/Behavioral:  Negative for confusion. The patient is nervous/anxious.          Objective:      Vitals:    06/18/24 1405   BP: (!) 165/108   Pulse: 88   Resp: 18   Temp: 98.9 °F (37.2 °C)       Physical Exam  Constitutional:       General: She is not in acute distress.     Appearance: Normal appearance. She is not ill-appearing.   HENT:      Head: Normocephalic and atraumatic.      Nose: Nose normal.      Mouth/Throat:      Mouth: Mucous membranes are moist.      Pharynx: Oropharynx is clear.   Eyes:      Extraocular Movements: Extraocular movements intact.      Conjunctiva/sclera: Conjunctivae normal.      Pupils: Pupils are equal, round, and reactive to light.   Cardiovascular:      Rate and Rhythm: Normal rate and regular rhythm.      Pulses: Normal pulses.      Heart sounds: Normal heart sounds. No murmur heard.  Pulmonary:      Effort: Pulmonary effort is normal. No respiratory distress.      Breath sounds: Normal breath sounds.   Abdominal:      General: There is no distension.      Palpations: Abdomen is soft.      Tenderness: There is no abdominal tenderness.   Musculoskeletal:         General: Normal range of motion.      Cervical back: Normal range of motion  and neck supple.      Right lower leg: No edema.      Left lower leg: No edema.   Lymphadenopathy:      Cervical: No cervical adenopathy.   Skin:     General: Skin is warm and dry.   Neurological:      General: No focal deficit present.      Mental Status: She is alert and oriented to person, place, and time.         LABS AND IMAGING REVIEWED IN EPIC    Imagin2024  BL DG MG/BL US BREAST LIMITED at [facility]-  IMPRESSION: BENIGN, ULTRASOUND SUSPICIOUS OF MALIGNANCY   A) R breast 5 o'clock subareolar 0.7 x 0.3 x 0.8 cm oval hypoechoic circumscribed vascular intraductal mass within a mildly dilated ducts correlates with the patient's history of right nipple discharge and is suspicious.  BI-RADS 4: Suspicious.    B) L breast 3 o'clock  2 cm FN 0.6 x 0.4 x 0.5 cm oval nonparallel hypoechoic circumscribed vascular mass was incidentally discovered and is suspicious.  BI-RADS 4: Suspicious.  C) No mammographic evidence of malignancy in either breast.     Pathology:  3/5/2024  Ultrasound-guided Core Needle Biopsy Left Breast 3 o'clock 2 cm FN-  - Sclerosing intraductal papilloma(measuring 3.5 mm) with superimposed organizing hemorrhage  - Focal moderate to florid ductal epithelial hyperplasia of the usual type     3/5/2024  Ultrasound-guided Core Needle Biopsy Right Breast 5 o'clock SA-  - Sclerosing intraductal papilloma (measuring 2.5 mm)  - No evidence of malignancy    2024 Bilateral excisional breast biopsy  Left - intraductal papilloma, focal ADH, ALH. Proliferative fibrocystic changes. Site of prior biopsy. Negative for in situ or invasive carcinoma.  Right - intraductal papilloma, partially sclerosed. Proliferative fibrocystic changes. Site of prior biopsy. Calcifications within benign lobules. Negative for invasive or in situ carcinoma.    Assessment:   Left Breast Atypical Ductal Hyperplasia - In addition to increased screening and lifestyle changes, she has agreed to proceed with antihormonal therapy for  risk reduction. She have since transitioned into 5 years of AI therapy.     DEXA due June '26      Plan:   -Toxicity reviewed; continue Anastrozole 1mg daily; refill sent today  -Plan for prolia; pending dental clearance and insurance approval ; form provided today  - Increase Effexor from 37.5 to 75mg po daily; if no improvement plan for [place]  - RTC 3 months for NP visit, labs same day    I spent a total of 40 minutes on the day of the visit.This includes face to face time and non-face to face time preparing to see the patient (eg, review of tests), obtaining and/or reviewing separately obtained history, documenting clinical information in the electronic or other health record, independently interpreting results and communicating results to the patient/family/caregiver, or care coordinator.      ALEX Jorgensen  Hematology/Oncology   [facility]

## 2024-06-18 ENCOUNTER — LAB VISIT (OUTPATIENT)
Dept: LAB | Facility: HOSPITAL | Age: 54
End: 2024-06-18
Attending: STUDENT IN AN ORGANIZED HEALTH CARE EDUCATION/TRAINING PROGRAM
Payer: COMMERCIAL

## 2024-06-18 ENCOUNTER — OFFICE VISIT (OUTPATIENT)
Dept: HEMATOLOGY/ONCOLOGY | Facility: CLINIC | Age: 54
End: 2024-06-18
Payer: COMMERCIAL

## 2024-06-18 ENCOUNTER — TELEPHONE (OUTPATIENT)
Dept: HEMATOLOGY/ONCOLOGY | Facility: CLINIC | Age: 54
End: 2024-06-18

## 2024-06-18 VITALS
SYSTOLIC BLOOD PRESSURE: 165 MMHG | BODY MASS INDEX: 34.2 KG/M2 | OXYGEN SATURATION: 94 % | RESPIRATION RATE: 18 BRPM | HEIGHT: 62 IN | TEMPERATURE: 99 F | DIASTOLIC BLOOD PRESSURE: 108 MMHG | WEIGHT: 185.88 LBS | HEART RATE: 88 BPM

## 2024-06-18 DIAGNOSIS — R45.86 MOOD ALTERED: ICD-10-CM

## 2024-06-18 DIAGNOSIS — M85.80 OSTEOPENIA, UNSPECIFIED LOCATION: Primary | ICD-10-CM

## 2024-06-18 DIAGNOSIS — N60.92 ATYPICAL LOBULAR HYPERPLASIA (ALH) OF LEFT BREAST: ICD-10-CM

## 2024-06-18 DIAGNOSIS — N60.92 ATYPICAL DUCTAL HYPERPLASIA OF LEFT BREAST: ICD-10-CM

## 2024-06-18 DIAGNOSIS — N95.1 HOT FLASHES DUE TO MENOPAUSE: ICD-10-CM

## 2024-06-18 LAB
ALBUMIN SERPL-MCNC: 3.7 G/DL (ref 3.5–5)
ALBUMIN/GLOB SERPL: 1 RATIO (ref 1.1–2)
ALP SERPL-CCNC: 93 UNIT/L (ref 40–150)
ALT SERPL-CCNC: 27 UNIT/L (ref 0–55)
ANION GAP SERPL CALC-SCNC: 7 MEQ/L
AST SERPL-CCNC: 23 UNIT/L (ref 5–34)
BASOPHILS # BLD AUTO: 0.04 X10(3)/MCL
BASOPHILS NFR BLD AUTO: 0.6 %
BILIRUB SERPL-MCNC: 0.2 MG/DL
BUN SERPL-MCNC: 11.8 MG/DL (ref 9.8–20.1)
CALCIUM SERPL-MCNC: 9 MG/DL (ref 8.4–10.2)
CHLORIDE SERPL-SCNC: 106 MMOL/L (ref 98–107)
CO2 SERPL-SCNC: 28 MMOL/L (ref 22–29)
CREAT SERPL-MCNC: 0.85 MG/DL (ref 0.55–1.02)
CREAT/UREA NIT SERPL: 14
EOSINOPHIL # BLD AUTO: 0.1 X10(3)/MCL (ref 0–0.9)
EOSINOPHIL NFR BLD AUTO: 1.4 %
ERYTHROCYTE [DISTWIDTH] IN BLOOD BY AUTOMATED COUNT: 13.7 % (ref 11.5–17)
GFR SERPLBLD CREATININE-BSD FMLA CKD-EPI: >60 ML/MIN/1.73/M2
GLOBULIN SER-MCNC: 3.6 GM/DL (ref 2.4–3.5)
GLUCOSE SERPL-MCNC: 98 MG/DL (ref 74–100)
HCT VFR BLD AUTO: 43.4 % (ref 37–47)
HGB BLD-MCNC: 13.9 G/DL (ref 12–16)
IMM GRANULOCYTES # BLD AUTO: 0.02 X10(3)/MCL (ref 0–0.04)
IMM GRANULOCYTES NFR BLD AUTO: 0.3 %
LYMPHOCYTES # BLD AUTO: 2.37 X10(3)/MCL (ref 0.6–4.6)
LYMPHOCYTES NFR BLD AUTO: 33 %
MCH RBC QN AUTO: 27.7 PG (ref 27–31)
MCHC RBC AUTO-ENTMCNC: 32 G/DL (ref 33–36)
MCV RBC AUTO: 86.5 FL (ref 80–94)
MONOCYTES # BLD AUTO: 0.63 X10(3)/MCL (ref 0.1–1.3)
MONOCYTES NFR BLD AUTO: 8.8 %
NEUTROPHILS # BLD AUTO: 4.02 X10(3)/MCL (ref 2.1–9.2)
NEUTROPHILS NFR BLD AUTO: 55.9 %
PLATELET # BLD AUTO: 305 X10(3)/MCL (ref 130–400)
PMV BLD AUTO: 9.7 FL (ref 7.4–10.4)
POTASSIUM SERPL-SCNC: 4 MMOL/L (ref 3.5–5.1)
PROT SERPL-MCNC: 7.3 GM/DL (ref 6.4–8.3)
RBC # BLD AUTO: 5.02 X10(6)/MCL (ref 4.2–5.4)
SODIUM SERPL-SCNC: 141 MMOL/L (ref 136–145)
WBC # BLD AUTO: 7.18 X10(3)/MCL (ref 4.5–11.5)

## 2024-06-18 PROCEDURE — 1160F RVW MEDS BY RX/DR IN RCRD: CPT | Mod: CPTII,S$GLB,,

## 2024-06-18 PROCEDURE — 99215 OFFICE O/P EST HI 40 MIN: CPT | Mod: S$GLB,,,

## 2024-06-18 PROCEDURE — 99999 PR PBB SHADOW E&M-EST. PATIENT-LVL IV: CPT | Mod: PBBFAC,,,

## 2024-06-18 PROCEDURE — 85025 COMPLETE CBC W/AUTO DIFF WBC: CPT

## 2024-06-18 PROCEDURE — 3077F SYST BP >= 140 MM HG: CPT | Mod: CPTII,S$GLB,,

## 2024-06-18 PROCEDURE — 36415 COLL VENOUS BLD VENIPUNCTURE: CPT

## 2024-06-18 PROCEDURE — 1159F MED LIST DOCD IN RCRD: CPT | Mod: CPTII,S$GLB,,

## 2024-06-18 PROCEDURE — 3080F DIAST BP >= 90 MM HG: CPT | Mod: CPTII,S$GLB,,

## 2024-06-18 PROCEDURE — 3008F BODY MASS INDEX DOCD: CPT | Mod: CPTII,S$GLB,,

## 2024-06-18 PROCEDURE — 80053 COMPREHEN METABOLIC PANEL: CPT

## 2024-06-18 RX ORDER — VENLAFAXINE HYDROCHLORIDE 37.5 MG/1
75 CAPSULE, EXTENDED RELEASE ORAL DAILY
Qty: 60 CAPSULE | Refills: 2 | Status: SHIPPED | OUTPATIENT
Start: 2024-06-18 | End: 2024-09-16

## 2024-07-18 DIAGNOSIS — N95.1 HOT FLASHES DUE TO MENOPAUSE: ICD-10-CM

## 2024-07-18 DIAGNOSIS — N60.92 ATYPICAL LOBULAR HYPERPLASIA (ALH) OF LEFT BREAST: ICD-10-CM

## 2024-07-18 DIAGNOSIS — N60.92 ATYPICAL DUCTAL HYPERPLASIA OF LEFT BREAST: ICD-10-CM

## 2024-07-18 RX ORDER — VENLAFAXINE HYDROCHLORIDE 37.5 MG/1
75 CAPSULE, EXTENDED RELEASE ORAL DAILY
Qty: 60 CAPSULE | Refills: 2 | Status: SHIPPED | OUTPATIENT
Start: 2024-07-18 | End: 2024-10-16

## 2024-07-23 ENCOUNTER — OFFICE VISIT (OUTPATIENT)
Dept: ORTHOPEDICS | Facility: CLINIC | Age: 54
End: 2024-07-23
Payer: COMMERCIAL

## 2024-07-23 VITALS
SYSTOLIC BLOOD PRESSURE: 156 MMHG | BODY MASS INDEX: 34.45 KG/M2 | HEIGHT: 62 IN | WEIGHT: 187.19 LBS | HEART RATE: 85 BPM | DIASTOLIC BLOOD PRESSURE: 102 MMHG

## 2024-07-23 DIAGNOSIS — M75.32 CALCIFIC TENDONITIS OF LEFT SHOULDER: ICD-10-CM

## 2024-07-23 DIAGNOSIS — S46.012A TRAUMATIC INCOMPLETE TEAR OF LEFT ROTATOR CUFF, INITIAL ENCOUNTER: Primary | ICD-10-CM

## 2024-07-23 DIAGNOSIS — Z02.6 ENCOUNTER RELATED TO WORKER'S COMPENSATION CLAIM: ICD-10-CM

## 2024-07-23 DIAGNOSIS — I10 HYPERTENSION, UNSPECIFIED TYPE: ICD-10-CM

## 2024-08-03 ENCOUNTER — HOSPITAL ENCOUNTER (EMERGENCY)
Facility: HOSPITAL | Age: 54
Discharge: HOME | End: 2024-08-03
Attending: FAMILY MEDICINE
Payer: COMMERCIAL

## 2024-08-03 VITALS
RESPIRATION RATE: 18 BRPM | TEMPERATURE: 98 F | WEIGHT: 186 LBS | HEIGHT: 62 IN | SYSTOLIC BLOOD PRESSURE: 156 MMHG | HEART RATE: 77 BPM | BODY MASS INDEX: 34.23 KG/M2 | DIASTOLIC BLOOD PRESSURE: 85 MMHG | OXYGEN SATURATION: 95 %

## 2024-08-03 DIAGNOSIS — J06.9 VIRAL URI WITH COUGH: Primary | ICD-10-CM

## 2024-08-03 DIAGNOSIS — R05.9 COUGH: ICD-10-CM

## 2024-08-03 PROCEDURE — 63600175 PHARM REV CODE 636 W HCPCS: Performed by: FAMILY MEDICINE

## 2024-08-03 PROCEDURE — 99284 EMERGENCY DEPT VISIT MOD MDM: CPT | Mod: 25

## 2024-08-03 PROCEDURE — 96372 THER/PROPH/DIAG INJ SC/IM: CPT | Performed by: FAMILY MEDICINE

## 2024-08-03 RX ORDER — PROMETHAZINE HYDROCHLORIDE AND DEXTROMETHORPHAN HYDROBROMIDE 6.25; 15 MG/5ML; MG/5ML
5 SYRUP ORAL EVERY 6 HOURS PRN
Qty: 118 ML | Refills: 0 | Status: SHIPPED | OUTPATIENT
Start: 2024-08-03 | End: 2024-08-13

## 2024-08-03 RX ORDER — DEXAMETHASONE SODIUM PHOSPHATE 4 MG/ML
8 INJECTION, SOLUTION INTRA-ARTICULAR; INTRALESIONAL; INTRAMUSCULAR; INTRAVENOUS; SOFT TISSUE
Status: COMPLETED | OUTPATIENT
Start: 2024-08-03 | End: 2024-08-03

## 2024-08-03 RX ADMIN — DEXAMETHASONE SODIUM PHOSPHATE 8 MG: 4 INJECTION, SOLUTION INTRA-ARTICULAR; INTRALESIONAL; INTRAMUSCULAR; INTRAVENOUS; SOFT TISSUE at 03:08

## 2024-08-04 ENCOUNTER — OFFICE VISIT (OUTPATIENT)
Dept: URGENT CARE | Facility: CLINIC | Age: 54
End: 2024-08-04
Payer: COMMERCIAL

## 2024-08-04 VITALS
DIASTOLIC BLOOD PRESSURE: 84 MMHG | HEIGHT: 62 IN | RESPIRATION RATE: 20 BRPM | BODY MASS INDEX: 33.86 KG/M2 | TEMPERATURE: 98 F | OXYGEN SATURATION: 96 % | HEART RATE: 82 BPM | WEIGHT: 184 LBS | SYSTOLIC BLOOD PRESSURE: 142 MMHG

## 2024-08-04 DIAGNOSIS — R05.9 COUGH, UNSPECIFIED TYPE: Primary | ICD-10-CM

## 2024-08-04 LAB
CTP QC/QA: YES
MOLECULAR STREP A: NEGATIVE

## 2024-08-04 PROCEDURE — 99213 OFFICE O/P EST LOW 20 MIN: CPT | Mod: ,,,

## 2024-08-04 PROCEDURE — 87651 STREP A DNA AMP PROBE: CPT | Mod: QW,,,

## 2024-08-04 RX ORDER — AZITHROMYCIN 250 MG/1
TABLET, FILM COATED ORAL
Qty: 6 TABLET | Refills: 0 | Status: SHIPPED | OUTPATIENT
Start: 2024-08-04 | End: 2024-08-09

## 2024-08-04 RX ORDER — ANASTROZOLE 1 MG/1
1 TABLET ORAL
COMMUNITY
Start: 2024-07-15

## 2024-08-04 RX ORDER — LOSARTAN POTASSIUM AND HYDROCHLOROTHIAZIDE 12.5; 5 MG/1; MG/1
1 TABLET ORAL
COMMUNITY
Start: 2024-07-24

## 2024-08-04 RX ORDER — NAPROXEN 500 MG/1
500 TABLET ORAL EVERY 8 HOURS PRN
COMMUNITY
Start: 2024-07-15

## 2024-08-04 RX ORDER — PREDNISONE 20 MG/1
20 TABLET ORAL DAILY
Qty: 5 TABLET | Refills: 0 | Status: SHIPPED | OUTPATIENT
Start: 2024-08-04 | End: 2024-08-09

## 2024-08-04 RX ORDER — PROMETHAZINE HYDROCHLORIDE 6.25 MG/5ML
5 SYRUP ORAL EVERY 8 HOURS PRN
COMMUNITY
Start: 2024-05-15

## 2024-08-06 ENCOUNTER — OFFICE VISIT (OUTPATIENT)
Dept: ORTHOPEDICS | Facility: CLINIC | Age: 54
End: 2024-08-06
Payer: COMMERCIAL

## 2024-08-06 VITALS
WEIGHT: 184.06 LBS | HEART RATE: 72 BPM | DIASTOLIC BLOOD PRESSURE: 100 MMHG | SYSTOLIC BLOOD PRESSURE: 160 MMHG | HEIGHT: 62 IN | BODY MASS INDEX: 33.87 KG/M2

## 2024-08-06 DIAGNOSIS — Z02.6 ENCOUNTER RELATED TO WORKER'S COMPENSATION CLAIM: ICD-10-CM

## 2024-08-06 DIAGNOSIS — S46.012D TRAUMATIC INCOMPLETE TEAR OF LEFT ROTATOR CUFF, SUBSEQUENT ENCOUNTER: Primary | ICD-10-CM

## 2024-08-06 DIAGNOSIS — M75.32 CALCIFIC TENDONITIS OF LEFT SHOULDER: ICD-10-CM

## 2024-08-06 PROCEDURE — 99214 OFFICE O/P EST MOD 30 MIN: CPT | Mod: ,,, | Performed by: ORTHOPAEDIC SURGERY

## 2024-08-19 DIAGNOSIS — N60.92 ATYPICAL LOBULAR HYPERPLASIA (ALH) OF LEFT BREAST: ICD-10-CM

## 2024-08-19 DIAGNOSIS — N95.1 HOT FLASHES DUE TO MENOPAUSE: ICD-10-CM

## 2024-08-19 DIAGNOSIS — N60.92 ATYPICAL DUCTAL HYPERPLASIA OF LEFT BREAST: ICD-10-CM

## 2024-08-20 ENCOUNTER — OFFICE VISIT (OUTPATIENT)
Dept: ORTHOPEDICS | Facility: CLINIC | Age: 54
End: 2024-08-20
Payer: COMMERCIAL

## 2024-08-20 VITALS
BODY MASS INDEX: 35.51 KG/M2 | SYSTOLIC BLOOD PRESSURE: 115 MMHG | WEIGHT: 193 LBS | HEART RATE: 88 BPM | DIASTOLIC BLOOD PRESSURE: 79 MMHG | HEIGHT: 62 IN

## 2024-08-20 DIAGNOSIS — M75.32 CALCIFIC TENDONITIS OF LEFT SHOULDER: ICD-10-CM

## 2024-08-20 DIAGNOSIS — Z02.6 ENCOUNTER RELATED TO WORKER'S COMPENSATION CLAIM: ICD-10-CM

## 2024-08-20 DIAGNOSIS — S46.012D TRAUMATIC INCOMPLETE TEAR OF LEFT ROTATOR CUFF, SUBSEQUENT ENCOUNTER: Primary | ICD-10-CM

## 2024-08-20 PROCEDURE — 99214 OFFICE O/P EST MOD 30 MIN: CPT | Mod: ,,, | Performed by: ORTHOPAEDIC SURGERY

## 2024-08-20 RX ORDER — GABAPENTIN 100 MG/1
300 CAPSULE ORAL
OUTPATIENT
Start: 2024-08-20

## 2024-08-20 RX ORDER — SODIUM CHLORIDE, SODIUM GLUCONATE, SODIUM ACETATE, POTASSIUM CHLORIDE AND MAGNESIUM CHLORIDE 30; 37; 368; 526; 502 MG/100ML; MG/100ML; MG/100ML; MG/100ML; MG/100ML
INJECTION, SOLUTION INTRAVENOUS CONTINUOUS
OUTPATIENT
Start: 2024-08-20

## 2024-08-20 RX ORDER — SCOLOPAMINE TRANSDERMAL SYSTEM 1 MG/1
1 PATCH, EXTENDED RELEASE TRANSDERMAL ONCE AS NEEDED
OUTPATIENT
Start: 2024-08-20 | End: 2036-01-17

## 2024-08-20 RX ORDER — ACETAMINOPHEN 500 MG
1000 TABLET ORAL
OUTPATIENT
Start: 2024-08-20

## 2024-08-20 RX ORDER — VENLAFAXINE HYDROCHLORIDE 37.5 MG/1
75 CAPSULE, EXTENDED RELEASE ORAL DAILY
Qty: 60 CAPSULE | Refills: 2 | Status: SHIPPED | OUTPATIENT
Start: 2024-08-20 | End: 2024-11-18

## 2024-08-20 RX ORDER — KETOROLAC TROMETHAMINE 10 MG/1
10 TABLET, FILM COATED ORAL ONCE
OUTPATIENT
Start: 2024-08-20 | End: 2024-08-25

## 2024-08-20 NOTE — H&P (VIEW-ONLY)
[facility]  Orthopedic Clinic Note      Chief Complaint:   Chief Complaint   Patient presents with    Left Shoulder - Pre-op Exam     **WC** Possible pre-op for Left RTC sx, accident is on the 3/12/24, wants it as soon as possible      Referring Physician: No ref. provider found      History of Present Illness:    This is a 54 y.o. year old female presenting with complaints of left shoulder pain since an injury that occurred on 03/13/2024.  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  She states that she lifted a heavy instrument pan overhead that resulted in severe pain in her left shoulder.  This shoulder pain is moderate to severe. Patient reports increased pain with overhead movement.  Patient reports night pain.  Patient reports anterolateral shoulder pain that radiates down the arm.  She was evaluated by her PCP for the same complaint who ordered x-ray imaging and MRI of the left shoulder.  MRI of the left shoulder demonstrated bulky calcific tendonitis with intermediate grade partial-thickness tearing of the rotator cuff.  She has been applying heat and ice, as well as taking over-the-counter Tylenol and anti-inflammatories with no improvement in her symptoms.  07/23/2024 this patient comes in today stating that physical therapy has not helped with her shoulder symptoms.  She does have an MRI that shows partial-thickness tearing.  08/06/2024 This patient returns today and is still having pain in her left shoulder.  We brought her back to see if there was any improvement in her blood pressure.  08/20/2024 patient presents for re-evaluation of left shoulder pain.  She has been working on her blood pressure and has had great improvement since her prior visit.  She would like to proceed with surgical intervention as soon as possible.      Past Medical History:   Diagnosis Date    Anxiety     Breast lump or mass     bilat    Hypertension     Insomnia        Past Surgical History:    Procedure Laterality Date     SECTION      I have 4 :  ,,,    COSMETIC SURGERY  2018    Had a tummy tuck    EXCISION, MASS, BREAST, USING RADIOLOGICAL MARKER Bilateral 2024    Procedure: EXCISION,MASS,BREAST,USING RADIOLOGICAL MARKER - [employer] Localization - BILATERAL Need Sentimag Need Faxitron Need sterile charms;  Surgeon: Myranda Breaux MD;  Location: [facility] OR;  Service: General;  Laterality: Bilateral;  Need Sentimag  Need Faxitron  Need sterile charms    HYSTERECTOMY  2015    Had an hysterectomy in that year    OOPHORECTOMY  2016    When i bailee my hysterectomy    SPINE SURGERY      (Neck)Dr.Jason Mckeon       Current Outpatient Medications   Medication Sig    anastrozole (ARIMIDEX) 1 mg Tab Take 1 mg by mouth.    EScitalopram oxalate (LEXAPRO) 20 MG tablet Take 20 mg by mouth every evening.    indomethacin (INDOCIN) 50 MG capsule Take 1 capsule (50 mg total) by mouth 3 (three) times daily with meals.    losartan-hydrochlorothiazide 50-12.5 mg (HYZAAR) 50-12.5 mg per tablet Take 1 tablet by mouth.    venlafaxine (EFFEXOR-XR) 37.5 MG 24 hr capsule Take 2 capsules (75 mg total) by mouth once daily.    zolpidem (AMBIEN) 10 mg Tab Take 10 mg by mouth every evening.    dextroamphetamine-amphetamine (ADDERALL) 20 mg tablet Take 1 tablet by mouth 2 (two) times daily. (Patient not taking: Reported on 2024)    naproxen (NAPROSYN) 500 MG tablet Take 500 mg by mouth every 8 (eight) hours as needed. (Patient not taking: Reported on 2024)    promethazine (PHENERGAN) 6.25 mg/5 mL syrup Take 5 mLs by mouth every 8 (eight) hours as needed. (Patient not taking: Reported on 2024)     No current facility-administered medications for this visit.       Review of patient's allergies indicates:  No Known Allergies    Family History   Problem Relation Name Age of Onset    Breast cancer Paternal Grandmother Karoline Small 80    Heart failure Mother Sarah Reidmichael          "Triple bypass    Heart disease Mother Sarah Johnson     Cancer Father Greta Johnson Sr.         Throat cancer       Social History     Socioeconomic History    Marital status:    Tobacco Use    Smoking status: Never    Smokeless tobacco: Never    Tobacco comments:     N/A   Substance and Sexual Activity    Alcohol use: Never    Drug use: Never    Sexual activity: Not Currently     Partners: Male     Birth control/protection: None           Review of Systems:  All review of systems negative except for those stated in the HPI.    Examination:    Vital Signs:    Vitals:    08/20/24 1019 08/20/24 1026   BP: (!) 145/91 115/79   Pulse: 88 (P) 88   Weight: 87.5 kg (193 lb)    Height: 5' 2" (1.575 m)        Body mass index is 35.3 kg/m².    Physical Examination:  General: Well-developed, well-nourished.  Neuro: Alert and oriented x 3.  Psych: Normal mood and affect.  Card: Regular rate and rhythm  Resp: Respirations regular and unlabored  Left Shoulder Exam:  No obvious deformity. No medial or lateral scapula winging. Forward flexion to 140 degrees and abduction to 140 degrees. Positive empty can, positive Whipple, Positive drop arm test, Andujar, impingement, Positive AC joint tenderness. Negative biceps groove tenderness, Positive Mohamud´s, Yergason´s, Speed test. Negative Apprehension and Relocation test. 4/5 strength, normal skin appearance and palpable pulses distally. Sensibility normal.      Imaging: Prior four views of the left shoulder demonstrate evidence of calcific tendonitis.    Assessment: Traumatic incomplete tear of left rotator cuff, subsequent encounter  -     Place in Outpatient; Standing  -     Vital signs; Standing  -     Insert peripheral IV; Standing  -     Clip and Prep Other (please specifiy) (Operative site); Standing  -     Cleanse with Chlorhexidine (CHG); Standing  -     Apply Nozin; Standing  -     Diet NPO; Standing  -     POCT glucose; Standing  -     CBC auto differential; Future  -     " Comprehensive metabolic panel; Future  -     EKG 12-lead; Future  -     Inpatient consult to Anesthesiology; Standing  -     Case Request Operating Room: REPAIR, ROTATOR CUFF, ARTHROSCOPIC  -     Place sequential compression device; Standing    Calcific tendonitis of left shoulder  -     Place in Outpatient; Standing  -     Vital signs; Standing  -     Insert peripheral IV; Standing  -     Clip and Prep Other (please specifiy) (Operative site); Standing  -     Cleanse with Chlorhexidine (CHG); Standing  -     Apply Nozin; Standing  -     Diet NPO; Standing  -     POCT glucose; Standing  -     CBC auto differential; Future  -     Comprehensive metabolic panel; Future  -     EKG 12-lead; Future  -     Inpatient consult to Anesthesiology; Standing  -     Case Request Operating Room: REPAIR, ROTATOR CUFF, ARTHROSCOPIC  -     Place sequential compression device; Standing    Encounter related to worker's compensation claim  -     Place in Outpatient; Standing  -     Vital signs; Standing  -     Insert peripheral IV; Standing  -     Clip and Prep Other (please specifiy) (Operative site); Standing  -     Cleanse with Chlorhexidine (CHG); Standing  -     Apply Nozin; Standing  -     Diet NPO; Standing  -     POCT glucose; Standing  -     CBC auto differential; Future  -     Comprehensive metabolic panel; Future  -     EKG 12-lead; Future  -     Inpatient consult to Anesthesiology; Standing  -     Case Request Operating Room: REPAIR, ROTATOR CUFF, ARTHROSCOPIC  -     Place sequential compression device; Standing    Other orders  -     electrolyte-A infusion  -     IP VTE LOW RISK PATIENT; Standing  -     ceFAZolin (ANCEF) 2,000 mg in D5W 50 mL IVPB  -     acetaminophen tablet 1,000 mg  -     ketorolac tablet 10 mg  -     gabapentin capsule 300 mg  -     scopolamine 1.3-1.5 mg (1 mg over 3 days) 1 patch        Plan:  She is said great improvement in her blood pressure since her prior visit.  Recommend surgical intervention via  left arthroscopic rotator cuff repair.  We will submit for approval through workman's comp and plan for surgical intervention on 09/11/2024 pending authorization.  Patient watched a video on the procedure and the associated complications.  We had an extensive discussion about the surgical procedure, the perioperative recovery, and postoperative recovery.  The proposed procedure as well as associated risks/benefits were discussed at length with the patient and family with risks to include pain, bleeding, infection, future surgeries, neurovascular compromise, loss of limb, heart attack, stroke, deep vein thrombosis, and even death.  Patient understands risks, benefits, and alternatives.  Patient signed an informed consent.  Patient will be placed on aspirin for DVT prophylaxis.  This patient will require a postoperative abduction pillow sling to unload the rotator cuff, labrum, and/or biceps tendon repair to allow for optimal postoperative healing.  She verbalized understanding of the plan of care with no further questions.    Bernabe Pack MD personally performed the services described in this documentation, including but not limited to patient's history, physical examination, and assessment and plan of care. All medical record entries made by CARMEN Youngblood were performed at his direction and in his presence. The medical record was reviewed and is accurate and complete.         No follow-ups on file.      DISCLAIMER: This note may have been dictated using voice recognition software and may contain grammatical errors.     NOTE: Consult report sent to referring provider via EPIC EMR.

## 2024-08-20 NOTE — LETTER
[facility] FORM 1010 - REQUEST OF AUTHORIZATION/CARRIER OR SELF INSURED EMPLOYER RESPONSE  PLEASE PRINT OR TYPE  SECTION 1. IDENTIFYING INFORMATION - To Be Filled Out By Health Care Provider    P  A Last Name:   Johnson First:   Katherin Middle:     Street Address, [facility], Zip:   [address] [address]   T  I Last 4 Digits of Social Security Number:   [id number] YOB: 1970 Phone Number:    [phone] (home)  Date of Injury:   03/01/2024   E  N  T Employers Name:    [employer], [facility], Zip:   [address] [place] [address] Phone Number:   [phone]     C  A  R  R Name:   [place] :   Alida Altamirano Claim Number (if known):   [id number]     I  E  R Street Address, City, State, Zip:   [facility] [address], [place] [address] Email Address:    Phone Number:   [phone] Fax Number:   [phone]   SECTION 2. REQUEST FOR AUTHORIZATION - To Be Filled Out By Health Care Provider      P Requesting Health Care Provider:   Bernabe Pack M.D. Phone Number:   [phone] Fax Number:   [phone]   R  O Street Address, City, State, Zip:   [address] [facility] [address], [place] [address] Email:       V  I Diagnosis: Calcific tendonitis of left shoulder, Traumatic incomplete tear of left rotator cuff    CPT/DRG Code:   62835  ICD/DSM Code:   S46.012D, M75.32   D  E Requested Treatment or Testing (Attach Supplement If Needed): Surgery scheduled for 09/11/2024 with pre op lab work to be done prior. NM SHLDR ARTHROSCOP,SURG,W/ROTAT CUFF REPR , REPAIR, ROTATOR CUFF, ARTHROSCOPIC    R Reason for Treatment or Testing (Attach Supplement If Needed): rotator cuff repair, Calcific tendonitis of left shoulder, Traumatic incomplete tear of left rotator cuff      INFORMATION REQUIRED BY RULE TO BE INCLUDED WITH REQUEST FOR AUTHORIZATION - To Be Filled Out By Health Care Provider  (Following is the required minimum information for Request of  Authorization (LAC 40:2715 (C))                    []  History provided to the level of condition and as provided by Medical Treatment Schedule   P                  []  Physical Findings/Clinical Tests   R                 []  Documented functional improvements from prior treatment   O                 []  Test/imaging results   V                 []  Treatment Plan including services being requested along with the frequency and duration   I  D  E                                                                                                                                            []     Faxed          to the Carrier/Self Insured Employer on this the      I hereby certify that this completed form and above required information was                                                           20     day of 08, 2024                                                                                                                             []      Emailed                  (day)                 (month)         (year)   R Signature of Health Care Provider:    Bernabe Pack M.D. - per attached records Printed Name:    Bernabe Pack M.D. - per attached records   SECTION 3. RESPONSE OF CARRIER/SELF INSURED EMPLOYER FOR AUTHORIZATION  (Check appropriate box below and return to requesting Health Care Provider, Claimant and Claimant  as provided by rule)       []  The requested Treatment or Testing is approved       []  The requested Treatment or Testing is approved with modifications (Attach summary of reasons and explanation of any modifications)       []  The requested Treatment or Testing is denied because                                       []          Not in accordance with Medical Treatment Schedule or R.S.23:1203.1(D) (Attach summary of reasons)                                       []          The request, or a portion thereof, is not related to the on-the-job injury                                        []          The claim is being denied as non-compensable                                       []          Other (Attach brief explanation)   C  A  R  R  I                                                                                                                                            []     Faxed          to the Health Care Provider (and to the  of                                                                                                                                                                             Claimant if one exists, if denied or approved with   I hereby certify that this response of  Carrier/Self Insured Employer for Authorization was                                                 modification) on this the                                                                                                                                                                                     ______  day of   _______ ,   _______                                                                                                                             []      Emailed                  (day)                 (month)         (year)   E  R Signature of Carrier/Self Insured Employer or Utilization Review Company: Printed Name:           []   The prior denied or approved with modification request is now  approved                                                                                                                                               []     Faxed          to the Health Care Provider and  of Claimant                                                                                                                                                                                                    if one exists on this the   I hereby certify that this response of  Carrier/Self Insured Employer for Authorization was                                      ______  day  of   _______ ,   _______                                                                                                                                             []      Emailed                      (day)                 (month)         (year)    Signature of Carrier/Self Insured Employer or Utilization Review Company:  Printed Name:       SECTION 4. FIRST REQUEST   (Form 1010A is required to be filled out by Carrier/Self Insured Employer and Health Care Provider)   C           []  The requested Treatment or Testing is delayed because minimum information required by rule was not provided   A  R  R  I                                                                                                                                            []     Faxed                 to the Health Care Provider on this the      I hereby certify that this First Request and accompanying Form 1010A was                                                       ______  day of   _______ ,   _______                                                                                                                             []      Emailed                  (day)                 (month)         (year)   E  R Signature of Carrier/Self Insured Employer or Utilization Review Company:     P  R  O  V  I  D                                                                                                                                            []     Faxed          to the Carrier/Self Insured Employer on this the                                I hereby certify that a response to the First Request and                                               accompanying Form 1010A was                                                                                 ______  day of   _______ ,   _______                                                                                                                             []      Emailed                  (day)                  (month)         (year)   E  R Signature of Health Care Provider: Printed Name:   SECTION 5. SUSPENSION OF PRIOR AUTHORIZATION DUE TO LACK OF INFORMATION       C   Suspension of Prior Authorization Process due to Lack of Information     A  R           []  The requested Treatment or Testing is delayed due to a Suspension of Prior Authorization Due to Lack of Information   R  I  E                                                                                                                           []     Faxed                 to the Health Care Provider on this the      I hereby certify that this Suspension of Prior Authorization was                                                       ______  day of   _______ ,   _______                                                                                                                            []      Emailed                  (day)                 (month)         (year)   R Signature of Carrier/Self Insured Employer or Utilization Review Company:   Printed Name:       P    Appeal of Suspension to Medical Services Section by Health Care Provider     R  O  V  I hereby certify that this form and all information previously submitted to Carrier/Self Insured Employer   was faxed to [employer]  (Fax Number: [phone] this ______  day of   _______ ,   _______   I  D  E                                                                                                                           []     Faxed       to the Carrier/Self Insured Employer on this the      I hereby certify that this Appeal of Suspension of Prior Authorization was                                        ______  day of   _______ ,   _______                                                                                                                            []      Emailed                  (day)                 (month)         (year)   R Signature of Health Care Provider:    Printed Name:     SECTION 6. DETERMINATION OF MEDICAL SERVICES SECTION              []  The required information of LAC40:2715(C) was not provided              []  The required information of LAC40:2715(C) was provided   O  GERMAN ELLIS                                                                                                                           []     Faxed           to the Health Care Provider  & Carrier/Self                                                                                                                                                                       Insured Employer on this the                      I hereby certify that a written determination was                                                                ______  day of   _______ ,   _______                                                                                                                            []      Emailed                  (day)                 (month)         (year)    Signature: Printed Name:     SECTION 7. HEALTH CARE PROVIDER RESPONSE TO MEDICAL SERVICES DETERMINATION   P  R  O  V  I  D                                                                                                                             []     Faxed       to the Carrier/Self Insured Employer on this the   I hereby certify that additional information, pursuant to the determination of                                       Medical Services Section, was                                    []      Emailed              ______  day of   _______ ,   _______                                                                                                                                                                     (day)                 (month)         (year)   E  R Signature of Health Care Provider: Printed Name:

## 2024-08-20 NOTE — PROGRESS NOTES
[facility]  Orthopedic Clinic Note      Chief Complaint:   Chief Complaint   Patient presents with    Left Shoulder - Pre-op Exam     **WC** Possible pre-op for Left RTC sx, accident is on the 3/12/24, wants it as soon as possible      Referring Physician: No ref. provider found      History of Present Illness:    This is a 54 y.o. year old female presenting with complaints of left shoulder pain since an injury that occurred on 03/13/2024.  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  She states that she lifted a heavy instrument pan overhead that resulted in severe pain in her left shoulder.  This shoulder pain is moderate to severe. Patient reports increased pain with overhead movement.  Patient reports night pain.  Patient reports anterolateral shoulder pain that radiates down the arm.  She was evaluated by her PCP for the same complaint who ordered x-ray imaging and MRI of the left shoulder.  MRI of the left shoulder demonstrated bulky calcific tendonitis with intermediate grade partial-thickness tearing of the rotator cuff.  She has been applying heat and ice, as well as taking over-the-counter Tylenol and anti-inflammatories with no improvement in her symptoms.  07/23/2024 this patient comes in today stating that physical therapy has not helped with her shoulder symptoms.  She does have an MRI that shows partial-thickness tearing.  08/06/2024 This patient returns today and is still having pain in her left shoulder.  We brought her back to see if there was any improvement in her blood pressure.  08/20/2024 patient presents for re-evaluation of left shoulder pain.  She has been working on her blood pressure and has had great improvement since her prior visit.  She would like to proceed with surgical intervention as soon as possible.      Past Medical History:   Diagnosis Date    Anxiety     Breast lump or mass     bilat    Hypertension     Insomnia        Past Surgical History:    Procedure Laterality Date     SECTION      I have 4 :  ,,,    COSMETIC SURGERY  2018    Had a tummy tuck    EXCISION, MASS, BREAST, USING RADIOLOGICAL MARKER Bilateral 2024    Procedure: EXCISION,MASS,BREAST,USING RADIOLOGICAL MARKER - [place] Localization - BILATERAL Need Sentimag Need Faxitron Need sterile charms;  Surgeon: Myranda Breaux MD;  Location: [facility] OR;  Service: General;  Laterality: Bilateral;  Need Sentimag  Need Faxitron  Need sterile charms    HYSTERECTOMY  2015    Had an hysterectomy in that year    OOPHORECTOMY  2016    When i bailee my hysterectomy    SPINE SURGERY      (Neck)Dr.Jason Mckeon       Current Outpatient Medications   Medication Sig    anastrozole (ARIMIDEX) 1 mg Tab Take 1 mg by mouth.    EScitalopram oxalate (LEXAPRO) 20 MG tablet Take 20 mg by mouth every evening.    indomethacin (INDOCIN) 50 MG capsule Take 1 capsule (50 mg total) by mouth 3 (three) times daily with meals.    losartan-hydrochlorothiazide 50-12.5 mg (HYZAAR) 50-12.5 mg per tablet Take 1 tablet by mouth.    venlafaxine (EFFEXOR-XR) 37.5 MG 24 hr capsule Take 2 capsules (75 mg total) by mouth once daily.    zolpidem (AMBIEN) 10 mg Tab Take 10 mg by mouth every evening.    dextroamphetamine-amphetamine (ADDERALL) 20 mg tablet Take 1 tablet by mouth 2 (two) times daily. (Patient not taking: Reported on 2024)    naproxen (NAPROSYN) 500 MG tablet Take 500 mg by mouth every 8 (eight) hours as needed. (Patient not taking: Reported on 2024)    promethazine (PHENERGAN) 6.25 mg/5 mL syrup Take 5 mLs by mouth every 8 (eight) hours as needed. (Patient not taking: Reported on 2024)     No current facility-administered medications for this visit.       Review of patient's allergies indicates:  No Known Allergies    Family History   Problem Relation Name Age of Onset    Breast cancer Paternal Grandmother Karoline Small 80    Heart failure Mother Sarah Reidmichael          "Triple bypass    Heart disease Mother Sarah Johnson     Cancer Father Greta Johnson Sr.         Throat cancer       Social History     Socioeconomic History    Marital status:    Tobacco Use    Smoking status: Never    Smokeless tobacco: Never    Tobacco comments:     N/A   Substance and Sexual Activity    Alcohol use: Never    Drug use: Never    Sexual activity: Not Currently     Partners: Male     Birth control/protection: None           Review of Systems:  All review of systems negative except for those stated in the HPI.    Examination:    Vital Signs:    Vitals:    08/20/24 1019 08/20/24 1026   BP: (!) 145/91 115/79   Pulse: 88 (P) 88   Weight: 87.5 kg (193 lb)    Height: 5' 2" (1.575 m)        Body mass index is 35.3 kg/m².    Physical Examination:  General: Well-developed, well-nourished.  Neuro: Alert and oriented x 3.  Psych: Normal mood and affect.  Card: Regular rate and rhythm  Resp: Respirations regular and unlabored  Left Shoulder Exam:  No obvious deformity. No medial or lateral scapula winging. Forward flexion to 140 degrees and abduction to 140 degrees. Positive empty can, positive Whipple, Positive drop arm test, Adnujar, impingement, Positive AC joint tenderness. Negative biceps groove tenderness, Positive Mohamud´s, Yergason´s, Speed test. Negative Apprehension and Relocation test. 4/5 strength, normal skin appearance and palpable pulses distally. Sensibility normal.      Imaging: Prior four views of the left shoulder demonstrate evidence of calcific tendonitis.    Assessment: Traumatic incomplete tear of left rotator cuff, subsequent encounter  -     Place in Outpatient; Standing  -     Vital signs; Standing  -     Insert peripheral IV; Standing  -     Clip and Prep Other (please specifiy) (Operative site); Standing  -     Cleanse with Chlorhexidine (CHG); Standing  -     Apply Nozin; Standing  -     Diet NPO; Standing  -     POCT glucose; Standing  -     CBC auto differential; Future  -     " Comprehensive metabolic panel; Future  -     EKG 12-lead; Future  -     Inpatient consult to Anesthesiology; Standing  -     Case Request Operating Room: REPAIR, ROTATOR CUFF, ARTHROSCOPIC  -     Place sequential compression device; Standing    Calcific tendonitis of left shoulder  -     Place in Outpatient; Standing  -     Vital signs; Standing  -     Insert peripheral IV; Standing  -     Clip and Prep Other (please specifiy) (Operative site); Standing  -     Cleanse with Chlorhexidine (CHG); Standing  -     Apply Nozin; Standing  -     Diet NPO; Standing  -     POCT glucose; Standing  -     CBC auto differential; Future  -     Comprehensive metabolic panel; Future  -     EKG 12-lead; Future  -     Inpatient consult to Anesthesiology; Standing  -     Case Request Operating Room: REPAIR, ROTATOR CUFF, ARTHROSCOPIC  -     Place sequential compression device; Standing    Encounter related to worker's compensation claim  -     Place in Outpatient; Standing  -     Vital signs; Standing  -     Insert peripheral IV; Standing  -     Clip and Prep Other (please specifiy) (Operative site); Standing  -     Cleanse with Chlorhexidine (CHG); Standing  -     Apply Nozin; Standing  -     Diet NPO; Standing  -     POCT glucose; Standing  -     CBC auto differential; Future  -     Comprehensive metabolic panel; Future  -     EKG 12-lead; Future  -     Inpatient consult to Anesthesiology; Standing  -     Case Request Operating Room: REPAIR, ROTATOR CUFF, ARTHROSCOPIC  -     Place sequential compression device; Standing    Other orders  -     electrolyte-A infusion  -     IP VTE LOW RISK PATIENT; Standing  -     ceFAZolin (ANCEF) 2,000 mg in D5W 50 mL IVPB  -     acetaminophen tablet 1,000 mg  -     ketorolac tablet 10 mg  -     gabapentin capsule 300 mg  -     scopolamine 1.3-1.5 mg (1 mg over 3 days) 1 patch        Plan:  She is said great improvement in her blood pressure since her prior visit.  Recommend surgical intervention via  left arthroscopic rotator cuff repair.  We will submit for approval through workman's comp and plan for surgical intervention on 09/11/2024 pending authorization.  Patient watched a video on the procedure and the associated complications.  We had an extensive discussion about the surgical procedure, the perioperative recovery, and postoperative recovery.  The proposed procedure as well as associated risks/benefits were discussed at length with the patient and family with risks to include pain, bleeding, infection, future surgeries, neurovascular compromise, loss of limb, heart attack, stroke, deep vein thrombosis, and even death.  Patient understands risks, benefits, and alternatives.  Patient signed an informed consent.  Patient will be placed on aspirin for DVT prophylaxis.  This patient will require a postoperative abduction pillow sling to unload the rotator cuff, labrum, and/or biceps tendon repair to allow for optimal postoperative healing.  She verbalized understanding of the plan of care with no further questions.    Bernabe Pack MD personally performed the services described in this documentation, including but not limited to patient's history, physical examination, and assessment and plan of care. All medical record entries made by CARMEN Youngblood were performed at his direction and in his presence. The medical record was reviewed and is accurate and complete.         No follow-ups on file.      DISCLAIMER: This note may have been dictated using voice recognition software and may contain grammatical errors.     NOTE: Consult report sent to referring provider via EP[employer] EMR.

## 2024-08-26 ENCOUNTER — LAB VISIT (OUTPATIENT)
Dept: LAB | Facility: HOSPITAL | Age: 54
End: 2024-08-26
Payer: COMMERCIAL

## 2024-08-26 ENCOUNTER — TELEPHONE (OUTPATIENT)
Dept: ORTHOPEDICS | Facility: CLINIC | Age: 54
End: 2024-08-26
Payer: COMMERCIAL

## 2024-08-26 DIAGNOSIS — Z02.6 ENCOUNTER RELATED TO WORKER'S COMPENSATION CLAIM: ICD-10-CM

## 2024-08-26 DIAGNOSIS — M75.32 CALCIFIC TENDONITIS OF LEFT SHOULDER: ICD-10-CM

## 2024-08-26 DIAGNOSIS — S46.012D TRAUMATIC INCOMPLETE TEAR OF LEFT ROTATOR CUFF, SUBSEQUENT ENCOUNTER: ICD-10-CM

## 2024-08-26 LAB
ALBUMIN SERPL-MCNC: 3.5 G/DL (ref 3.5–5)
ALBUMIN/GLOB SERPL: 1.1 RATIO (ref 1.1–2)
ALP SERPL-CCNC: 78 UNIT/L (ref 40–150)
ALT SERPL-CCNC: 26 UNIT/L (ref 0–55)
ANION GAP SERPL CALC-SCNC: 11 MEQ/L
AST SERPL-CCNC: 23 UNIT/L (ref 5–34)
BASOPHILS # BLD AUTO: 0.05 X10(3)/MCL
BASOPHILS NFR BLD AUTO: 0.7 %
BILIRUB SERPL-MCNC: 0.2 MG/DL
BUN SERPL-MCNC: 12.1 MG/DL (ref 9.8–20.1)
CALCIUM SERPL-MCNC: 9.4 MG/DL (ref 8.4–10.2)
CHLORIDE SERPL-SCNC: 106 MMOL/L (ref 98–107)
CO2 SERPL-SCNC: 27 MMOL/L (ref 22–29)
CREAT SERPL-MCNC: 0.88 MG/DL (ref 0.55–1.02)
CREAT/UREA NIT SERPL: 14
EOSINOPHIL # BLD AUTO: 0.28 X10(3)/MCL (ref 0–0.9)
EOSINOPHIL NFR BLD AUTO: 4 %
ERYTHROCYTE [DISTWIDTH] IN BLOOD BY AUTOMATED COUNT: 13.2 % (ref 11.5–17)
GFR SERPLBLD CREATININE-BSD FMLA CKD-EPI: >60 ML/MIN/1.73/M2
GLOBULIN SER-MCNC: 3.3 GM/DL (ref 2.4–3.5)
GLUCOSE SERPL-MCNC: 89 MG/DL (ref 74–100)
HCT VFR BLD AUTO: 39.8 % (ref 37–47)
HGB BLD-MCNC: 12.8 G/DL (ref 12–16)
IMM GRANULOCYTES # BLD AUTO: 0.03 X10(3)/MCL (ref 0–0.04)
IMM GRANULOCYTES NFR BLD AUTO: 0.4 %
LYMPHOCYTES # BLD AUTO: 2.94 X10(3)/MCL (ref 0.6–4.6)
LYMPHOCYTES NFR BLD AUTO: 42.2 %
MCH RBC QN AUTO: 28.1 PG (ref 27–31)
MCHC RBC AUTO-ENTMCNC: 32.2 G/DL (ref 33–36)
MCV RBC AUTO: 87.5 FL (ref 80–94)
MONOCYTES # BLD AUTO: 0.88 X10(3)/MCL (ref 0.1–1.3)
MONOCYTES NFR BLD AUTO: 12.6 %
NEUTROPHILS # BLD AUTO: 2.79 X10(3)/MCL (ref 2.1–9.2)
NEUTROPHILS NFR BLD AUTO: 40.1 %
NRBC BLD AUTO-RTO: 0 %
PLATELET # BLD AUTO: 302 X10(3)/MCL (ref 130–400)
PMV BLD AUTO: 10 FL (ref 7.4–10.4)
POTASSIUM SERPL-SCNC: 3.6 MMOL/L (ref 3.5–5.1)
PROT SERPL-MCNC: 6.8 GM/DL (ref 6.4–8.3)
RBC # BLD AUTO: 4.55 X10(6)/MCL (ref 4.2–5.4)
SODIUM SERPL-SCNC: 144 MMOL/L (ref 136–145)
WBC # BLD AUTO: 6.97 X10(3)/MCL (ref 4.5–11.5)

## 2024-08-26 PROCEDURE — 85025 COMPLETE CBC W/AUTO DIFF WBC: CPT

## 2024-08-26 PROCEDURE — 80053 COMPREHEN METABOLIC PANEL: CPT

## 2024-08-26 PROCEDURE — 36415 COLL VENOUS BLD VENIPUNCTURE: CPT

## 2024-08-26 NOTE — TELEPHONE ENCOUNTER
Peer to peer being requested for upcoming surgery. We have to call to schedule the meeting: [facility] [phone]. Whenever I called to schedule the voicemail asked for Provider name, surgery details, patient details name,, surgery codes, any pert or positive information about injury/accident, hx of tx, hx MRI findings, surgical plan, physical examination. Basically the entire note details to be left on voicemail before we get a call back.

## 2024-08-28 ENCOUNTER — ANESTHESIA EVENT (OUTPATIENT)
Dept: SURGERY | Facility: HOSPITAL | Age: 54
End: 2024-08-28
Payer: COMMERCIAL

## 2024-09-09 ENCOUNTER — CLINICAL SUPPORT (OUTPATIENT)
Dept: LAB | Facility: HOSPITAL | Age: 54
End: 2024-09-09
Attending: ORTHOPAEDIC SURGERY
Payer: COMMERCIAL

## 2024-09-09 DIAGNOSIS — S46.012D TRAUMATIC INCOMPLETE TEAR OF LEFT ROTATOR CUFF, SUBSEQUENT ENCOUNTER: ICD-10-CM

## 2024-09-09 DIAGNOSIS — M75.32 CALCIFIC TENDONITIS OF LEFT SHOULDER: ICD-10-CM

## 2024-09-09 DIAGNOSIS — Z02.6 ENCOUNTER RELATED TO WORKER'S COMPENSATION CLAIM: ICD-10-CM

## 2024-09-09 PROCEDURE — 93010 ELECTROCARDIOGRAM REPORT: CPT | Mod: ,,, | Performed by: INTERNAL MEDICINE

## 2024-09-09 PROCEDURE — 93005 ELECTROCARDIOGRAM TRACING: CPT

## 2024-09-09 NOTE — CLINICAL REVIEW
labs reviewed. EKG ordered, but not complete. Awaiting EKG. sd //     EKG reviewed. cardiology notes ood but sufficient for case. Carotid/Angio/Echo noted. chart review complete. ccv

## 2024-09-11 ENCOUNTER — ANESTHESIA (OUTPATIENT)
Dept: SURGERY | Facility: HOSPITAL | Age: 54
End: 2024-09-11
Payer: COMMERCIAL

## 2024-09-11 LAB
OHS QRS DURATION: 84 MS
OHS QTC CALCULATION: 434 MS

## 2024-09-16 ENCOUNTER — HOSPITAL ENCOUNTER (OUTPATIENT)
Facility: HOSPITAL | Age: 54
Discharge: HOME | End: 2024-09-16
Attending: ORTHOPAEDIC SURGERY | Admitting: ORTHOPAEDIC SURGERY
Payer: COMMERCIAL

## 2024-09-16 DIAGNOSIS — M75.32 CALCIFIC TENDONITIS OF LEFT SHOULDER: ICD-10-CM

## 2024-09-16 DIAGNOSIS — S46.012D TRAUMATIC INCOMPLETE TEAR OF LEFT ROTATOR CUFF, SUBSEQUENT ENCOUNTER: ICD-10-CM

## 2024-09-16 DIAGNOSIS — Z02.6 ENCOUNTER RELATED TO WORKER'S COMPENSATION CLAIM: ICD-10-CM

## 2024-09-16 PROCEDURE — 29827 SHO ARTHRS SRG RT8TR CUF RPR: CPT | Mod: LT,,, | Performed by: ORTHOPAEDIC SURGERY

## 2024-09-16 PROCEDURE — 71000033 HC RECOVERY, INTIAL HOUR: Performed by: ORTHOPAEDIC SURGERY

## 2024-09-16 PROCEDURE — 63600175 PHARM REV CODE 636 W HCPCS: Performed by: STUDENT IN AN ORGANIZED HEALTH CARE EDUCATION/TRAINING PROGRAM

## 2024-09-16 PROCEDURE — 63600175 PHARM REV CODE 636 W HCPCS

## 2024-09-16 PROCEDURE — 25000003 PHARM REV CODE 250

## 2024-09-16 PROCEDURE — D9220A PRA ANESTHESIA: Mod: ANES,,, | Performed by: STUDENT IN AN ORGANIZED HEALTH CARE EDUCATION/TRAINING PROGRAM

## 2024-09-16 PROCEDURE — 36000710: Performed by: ORTHOPAEDIC SURGERY

## 2024-09-16 PROCEDURE — 37000008 HC ANESTHESIA 1ST 15 MINUTES: Performed by: ORTHOPAEDIC SURGERY

## 2024-09-16 PROCEDURE — 71000015 HC POSTOP RECOV 1ST HR: Performed by: ORTHOPAEDIC SURGERY

## 2024-09-16 PROCEDURE — 37000009 HC ANESTHESIA EA ADD 15 MINS: Performed by: ORTHOPAEDIC SURGERY

## 2024-09-16 PROCEDURE — C1713 ANCHOR/SCREW BN/BN,TIS/BN: HCPCS | Performed by: ORTHOPAEDIC SURGERY

## 2024-09-16 PROCEDURE — 29824 SHO ARTHRS SRG DSTL CLAVICLC: CPT | Mod: 51,LT,, | Performed by: ORTHOPAEDIC SURGERY

## 2024-09-16 PROCEDURE — 29827 SHO ARTHRS SRG RT8TR CUF RPR: CPT | Mod: AS,LT,,

## 2024-09-16 PROCEDURE — 64415 NJX AA&/STRD BRCH PLXS IMG: CPT | Performed by: STUDENT IN AN ORGANIZED HEALTH CARE EDUCATION/TRAINING PROGRAM

## 2024-09-16 PROCEDURE — 27201423 OPTIME MED/SURG SUP & DEVICES STERILE SUPPLY: Performed by: ORTHOPAEDIC SURGERY

## 2024-09-16 PROCEDURE — 29824 SHO ARTHRS SRG DSTL CLAVICLC: CPT | Mod: 51,AS,LT,

## 2024-09-16 PROCEDURE — 71000016 HC POSTOP RECOV ADDL HR: Performed by: ORTHOPAEDIC SURGERY

## 2024-09-16 PROCEDURE — 36000711: Performed by: ORTHOPAEDIC SURGERY

## 2024-09-16 PROCEDURE — D9220A PRA ANESTHESIA: Mod: CRNA,,,

## 2024-09-16 DEVICE — SWIVELOCK, SP BC KL 5.5MM
Type: IMPLANTABLE DEVICE | Site: SHOULDER | Status: FUNCTIONAL
Brand: ARTHREX®

## 2024-09-16 DEVICE — ANCHOR SUTURE SWIVILOK 19.1MM: Type: IMPLANTABLE DEVICE | Site: SHOULDER | Status: FUNCTIONAL

## 2024-09-16 RX ORDER — SODIUM CHLORIDE, SODIUM GLUCONATE, SODIUM ACETATE, POTASSIUM CHLORIDE AND MAGNESIUM CHLORIDE 30; 37; 368; 526; 502 MG/100ML; MG/100ML; MG/100ML; MG/100ML; MG/100ML
INJECTION, SOLUTION INTRAVENOUS CONTINUOUS
Status: DISCONTINUED | OUTPATIENT
Start: 2024-09-16 | End: 2024-09-16 | Stop reason: HOSPADM

## 2024-09-16 RX ORDER — METOCLOPRAMIDE HYDROCHLORIDE 5 MG/ML
10 INJECTION INTRAMUSCULAR; INTRAVENOUS EVERY 6 HOURS PRN
Status: DISCONTINUED | OUTPATIENT
Start: 2024-09-16 | End: 2024-09-16 | Stop reason: HOSPADM

## 2024-09-16 RX ORDER — SODIUM CHLORIDE 9 MG/ML
INJECTION, SOLUTION INTRAVENOUS CONTINUOUS
Status: DISCONTINUED | OUTPATIENT
Start: 2024-09-16 | End: 2024-09-16 | Stop reason: HOSPADM

## 2024-09-16 RX ORDER — GABAPENTIN 300 MG/1
300 CAPSULE ORAL
Status: COMPLETED | OUTPATIENT
Start: 2024-09-16 | End: 2024-09-16

## 2024-09-16 RX ORDER — METHOCARBAMOL 500 MG/1
1000 TABLET, FILM COATED ORAL EVERY 6 HOURS PRN
Status: DISCONTINUED | OUTPATIENT
Start: 2024-09-16 | End: 2024-09-16 | Stop reason: HOSPADM

## 2024-09-16 RX ORDER — MUPIROCIN 20 MG/G
OINTMENT TOPICAL 2 TIMES DAILY
Status: DISCONTINUED | OUTPATIENT
Start: 2024-09-16 | End: 2024-09-16 | Stop reason: HOSPADM

## 2024-09-16 RX ORDER — ONDANSETRON 4 MG/1
4 TABLET, ORALLY DISINTEGRATING ORAL EVERY 6 HOURS PRN
Qty: 30 TABLET | Refills: 0 | Status: SHIPPED | OUTPATIENT
Start: 2024-09-16 | End: 2024-09-26

## 2024-09-16 RX ORDER — ACETAMINOPHEN 500 MG
1000 TABLET ORAL
Status: COMPLETED | OUTPATIENT
Start: 2024-09-16 | End: 2024-09-16

## 2024-09-16 RX ORDER — DEXAMETHASONE SODIUM PHOSPHATE 4 MG/ML
INJECTION, SOLUTION INTRA-ARTICULAR; INTRALESIONAL; INTRAMUSCULAR; INTRAVENOUS; SOFT TISSUE
Status: DISCONTINUED | OUTPATIENT
Start: 2024-09-16 | End: 2024-09-16

## 2024-09-16 RX ORDER — KETOROLAC TROMETHAMINE 10 MG/1
10 TABLET, FILM COATED ORAL ONCE
Status: COMPLETED | OUTPATIENT
Start: 2024-09-16 | End: 2024-09-16

## 2024-09-16 RX ORDER — HYDROCODONE BITARTRATE AND ACETAMINOPHEN 5; 325 MG/1; MG/1
1 TABLET ORAL EVERY 4 HOURS PRN
Status: DISCONTINUED | OUTPATIENT
Start: 2024-09-16 | End: 2024-09-16 | Stop reason: HOSPADM

## 2024-09-16 RX ORDER — ONDANSETRON HYDROCHLORIDE 2 MG/ML
INJECTION, SOLUTION INTRAVENOUS
Status: DISCONTINUED | OUTPATIENT
Start: 2024-09-16 | End: 2024-09-16

## 2024-09-16 RX ORDER — PROPOFOL 10 MG/ML
VIAL (ML) INTRAVENOUS
Status: DISCONTINUED | OUTPATIENT
Start: 2024-09-16 | End: 2024-09-16

## 2024-09-16 RX ORDER — MIDAZOLAM HYDROCHLORIDE 2 MG/2ML
INJECTION, SOLUTION INTRAMUSCULAR; INTRAVENOUS
Status: DISCONTINUED
Start: 2024-09-16 | End: 2024-09-16 | Stop reason: HOSPADM

## 2024-09-16 RX ORDER — GLYCOPYRROLATE 0.2 MG/ML
INJECTION INTRAMUSCULAR; INTRAVENOUS
Status: DISCONTINUED | OUTPATIENT
Start: 2024-09-16 | End: 2024-09-16

## 2024-09-16 RX ORDER — KETOROLAC TROMETHAMINE 10 MG/1
10 TABLET, FILM COATED ORAL EVERY 6 HOURS
Qty: 20 TABLET | Refills: 0 | Status: SHIPPED | OUTPATIENT
Start: 2024-09-16 | End: 2024-09-21

## 2024-09-16 RX ORDER — ONDANSETRON HYDROCHLORIDE 2 MG/ML
4 INJECTION, SOLUTION INTRAVENOUS EVERY 6 HOURS PRN
Status: DISCONTINUED | OUTPATIENT
Start: 2024-09-16 | End: 2024-09-16 | Stop reason: HOSPADM

## 2024-09-16 RX ORDER — ROCURONIUM BROMIDE 10 MG/ML
INJECTION, SOLUTION INTRAVENOUS
Status: DISCONTINUED | OUTPATIENT
Start: 2024-09-16 | End: 2024-09-16

## 2024-09-16 RX ORDER — SCOLOPAMINE TRANSDERMAL SYSTEM 1 MG/1
1 PATCH, EXTENDED RELEASE TRANSDERMAL ONCE AS NEEDED
Status: DISCONTINUED | OUTPATIENT
Start: 2024-09-16 | End: 2024-09-16 | Stop reason: HOSPADM

## 2024-09-16 RX ORDER — METHOCARBAMOL 750 MG/1
750 TABLET, FILM COATED ORAL EVERY 6 HOURS
Qty: 56 TABLET | Refills: 0 | Status: SHIPPED | OUTPATIENT
Start: 2024-09-16 | End: 2024-09-30

## 2024-09-16 RX ORDER — MELOXICAM 7.5 MG/1
TABLET ORAL
Qty: 30 TABLET | Refills: 0 | Status: SHIPPED | OUTPATIENT
Start: 2024-09-22 | End: 2024-10-01

## 2024-09-16 RX ORDER — GABAPENTIN 300 MG/1
300 CAPSULE ORAL EVERY 8 HOURS
Qty: 15 CAPSULE | Refills: 0 | Status: SHIPPED | OUTPATIENT
Start: 2024-09-16 | End: 2024-09-21

## 2024-09-16 RX ORDER — FENTANYL CITRATE 50 UG/ML
INJECTION, SOLUTION INTRAMUSCULAR; INTRAVENOUS
Status: DISCONTINUED | OUTPATIENT
Start: 2024-09-16 | End: 2024-09-16

## 2024-09-16 RX ORDER — MIDAZOLAM HYDROCHLORIDE 1 MG/ML
2 INJECTION INTRAMUSCULAR; INTRAVENOUS ONCE
Status: COMPLETED | OUTPATIENT
Start: 2024-09-16 | End: 2024-09-16

## 2024-09-16 RX ORDER — ROPIVACAINE HYDROCHLORIDE 5 MG/ML
INJECTION, SOLUTION EPIDURAL; INFILTRATION; PERINEURAL
Status: COMPLETED
Start: 2024-09-16 | End: 2024-09-16

## 2024-09-16 RX ORDER — MORPHINE SULFATE 4 MG/ML
3 INJECTION, SOLUTION INTRAMUSCULAR; INTRAVENOUS
Status: DISCONTINUED | OUTPATIENT
Start: 2024-09-16 | End: 2024-09-16 | Stop reason: HOSPADM

## 2024-09-16 RX ORDER — ROPIVACAINE HYDROCHLORIDE 5 MG/ML
INJECTION, SOLUTION EPIDURAL; INFILTRATION; PERINEURAL
Status: COMPLETED | OUTPATIENT
Start: 2024-09-16 | End: 2024-09-16

## 2024-09-16 RX ORDER — ALUMINUM HYDROXIDE, MAGNESIUM HYDROXIDE, AND SIMETHICONE 1200; 120; 1200 MG/30ML; MG/30ML; MG/30ML
30 SUSPENSION ORAL EVERY 6 HOURS PRN
Status: DISCONTINUED | OUTPATIENT
Start: 2024-09-16 | End: 2024-09-16 | Stop reason: HOSPADM

## 2024-09-16 RX ORDER — LIDOCAINE HYDROCHLORIDE 20 MG/ML
INJECTION, SOLUTION EPIDURAL; INFILTRATION; INTRACAUDAL; PERINEURAL
Status: DISCONTINUED | OUTPATIENT
Start: 2024-09-16 | End: 2024-09-16

## 2024-09-16 RX ORDER — OXYCODONE HYDROCHLORIDE 5 MG/1
5 TABLET ORAL EVERY 12 HOURS PRN
Qty: 14 TABLET | Refills: 0 | Status: SHIPPED | OUTPATIENT
Start: 2024-09-16 | End: 2024-09-23

## 2024-09-16 RX ADMIN — ACETAMINOPHEN 1000 MG: 500 TABLET ORAL at 06:09

## 2024-09-16 RX ADMIN — SODIUM CHLORIDE, SODIUM GLUCONATE, SODIUM ACETATE, POTASSIUM CHLORIDE AND MAGNESIUM CHLORIDE: 526; 502; 368; 37; 30 INJECTION, SOLUTION INTRAVENOUS at 06:09

## 2024-09-16 RX ADMIN — CEFAZOLIN 2 G: 2 INJECTION, POWDER, FOR SOLUTION INTRAMUSCULAR; INTRAVENOUS at 10:09

## 2024-09-16 RX ADMIN — GLYCOPYRROLATE 0.2 MG: 0.2 INJECTION INTRAMUSCULAR; INTRAVENOUS at 10:09

## 2024-09-16 RX ADMIN — KETOROLAC TROMETHAMINE 10 MG: 10 TABLET, FILM COATED ORAL at 06:09

## 2024-09-16 RX ADMIN — GABAPENTIN 300 MG: 300 CAPSULE ORAL at 06:09

## 2024-09-16 RX ADMIN — DEXAMETHASONE SODIUM PHOSPHATE 4 MG: 4 INJECTION, SOLUTION INTRA-ARTICULAR; INTRALESIONAL; INTRAMUSCULAR; INTRAVENOUS; SOFT TISSUE at 10:09

## 2024-09-16 RX ADMIN — ROCURONIUM BROMIDE 50 MG: 10 SOLUTION INTRAVENOUS at 10:09

## 2024-09-16 RX ADMIN — SUGAMMADEX 200 MG: 100 INJECTION, SOLUTION INTRAVENOUS at 11:09

## 2024-09-16 RX ADMIN — LIDOCAINE HYDROCHLORIDE 50 MG: 20 INJECTION, SOLUTION EPIDURAL; INFILTRATION; INTRACAUDAL; PERINEURAL at 10:09

## 2024-09-16 RX ADMIN — MIDAZOLAM HYDROCHLORIDE 2 MG: 1 INJECTION, SOLUTION INTRAMUSCULAR; INTRAVENOUS at 08:09

## 2024-09-16 RX ADMIN — FENTANYL CITRATE 50 MCG: 50 INJECTION, SOLUTION INTRAMUSCULAR; INTRAVENOUS at 10:09

## 2024-09-16 RX ADMIN — ONDANSETRON HYDROCHLORIDE 4 MG: 2 SOLUTION INTRAMUSCULAR; INTRAVENOUS at 10:09

## 2024-09-16 RX ADMIN — ROCURONIUM BROMIDE 10 MG: 10 SOLUTION INTRAVENOUS at 11:09

## 2024-09-16 RX ADMIN — PROPOFOL 140 MG: 10 INJECTION, EMULSION INTRAVENOUS at 10:09

## 2024-09-16 RX ADMIN — ROPIVACAINE HYDROCHLORIDE 30 ML: 5 INJECTION, SOLUTION EPIDURAL; INFILTRATION; PERINEURAL at 08:09

## 2024-09-16 RX ADMIN — FENTANYL CITRATE 25 MCG: 50 INJECTION, SOLUTION INTRAMUSCULAR; INTRAVENOUS at 10:09

## 2024-09-16 NOTE — DISCHARGE INSTRUCTIONS
Discharge Instructions    Bernabe Pack MD  [address] [facility], [address]  [place] [address]  [phone]    Instructions for After Surgery:  Follow up:  -Postoperative follow up evaluation should be scheduled for 2 weeks postoperatively.  -Physical therapy referral has been routed to the [facility] physical therapy clinic.  That clinic will contact you to schedule your first postoperative appointment.  You should be scheduled to begin postoperative physical therapy at approximately 1 week postop.    Prescriptions:  -All prescriptions have been electronically routed to your pharmacy.  -See multi-modal pain protocol instructions.  -While taking anti-inflammatories (ketorolac (Toradol) and meloxicam (Mobic)), avoid all other over-the-counter anti-inflammatories (ex. Advil, Aleve, ibuprofen, Motrin, naproxen, etc.)  -While taking anti-inflammatories, you may want to take a medication to protect your stomach (ex. omeprazole, Prilosec, Prevacid, Pepcid, Nexium, etc.)  -Take anti-inflammatories with food and discontinue if GI upset occurs.  -The narcotic prescription Oxycodone and gabapentin (Neurontin) may cause sedation, do NOT drive while taking these medications.    Diet:  -The first meal at home should be a liquid meal (ex. Soup and/or juices) to prevent postoperative nausea.   -Advance to normal diet as tolerated if no postoperative nausea.  -Take prescribed ondansetron (Zofran) as needed for any postoperative nausea and vomiting.    Constipation Prevention:  -Take Miralax or Senakot S/Rosalba-Colace and stool softeners DAILY while taking pain medications.  -Increase water and fiber intake.  -Move around as much as possible.  -Use other, more aggressive, over-the-counter laxatives as needed for constipation (i.e. Milk of Magnesia, Dulcolax tablet/suppository, Magnesium citrate, Fleet's enema, etc.)    Wound Care:  -Remove dressing after 5 days ? there will be a lot of drainage on your dressing, which is  normal.  -Do NOT remove Steri-Strips ? apply a gauze/tape dressing or Band-Aid over your Steri-Strips until follow up.  -Change dressing daily following initial dressing change.  -Once the incision has no drainage, you no longer need a dressing.  -It is okay to take a shower/lightly run water over the wound AFTER daily dressing changes have been discontinued.  -Do NOT submerge the wound in water ? it is okay to take a sponge bath, use waterproof bandages, or place a sealed plastic bag over the operative extremity.    Bracing:  -Abduction sling MUST be worn at all times, except when washing your body, changing clothes, or participating in physical therapy.  -Do NOT adjust settings on your brace.  -It is okay to adjust/loosen the tightness of the Abduction sling.    Blood Clot Prevention:  -Take Aspirin 81 mg twice daily for 2 weeks postoperatively.  -Get up and walk around as much as possible.  -Utilize compression hose for 14 days postoperatively to assist with any lower extremity swelling.    Urinary Retention:  If you start having difficulty urinating, decrease Oxycodone and Robaxin and call your primary care doctor or urologist.     Pneumonia Prevention:  Stay out of bed as much as possible, walk around at least every 2 hours and continue breathing exercises as long as you are limited in mobility and on narcotics.     Ice (cryotherapy):  -Cryotherapy (i.e. cold therapy unit or ice bags) has been clinically proven to reduce pain and help control swelling postoperatively.  -Apply cryotherapy for 30 minutes/session with at least a 10-minute break to prevent cold burn injuries.  -Cryotherapy is most beneficial in the first 48-72 hours postoperatively, and can be performed as needed after that.  -Cryotherapy can be used as needed for periodic pain/swelling episodes several weeks postoperatively.    Other Instructions:  -Keep extremity elevated (i.e. above the level of the heart) as much as possible to prevent swelling  and reduce pain.  -Non weight bearing to the operative extremity.  -If you had a nerve block, take your Oxycodone BEFORE your pain becomes too severe.  -Utilize PingThings joaquin on your smart phone to record and track medications listed below            [facility]  [address] [place] [id number] [address]  Phone [phone]/ Fax [phone]  Multimodal Pain Management Instructions  Postoperative regimen:      *Oxycodone 5 mg (Opioid Narcotic) can be used twice daily for severe breakthrough pain ONLY*  Gradually reduce the use as the pain lessens.    Utilize Power Fingerprinting joaquin on your smart phone to record and track medications            Days 1-5:     Toradol/Ketorolac (anti-inflammatory) - take 10mg every 6 hours, around the clock. (While on Toradol, take a medication to protect your stomach, such as Omeprazole, Prilosec, Prevacid, Pepcid, Nexium....etc).     Robaxin/Methocarbamol (muscle relaxer) 750mg every 6 hours, around the clock for muscle spasms, thigh pain and stiffness, additional pain control. This medication is helpful for pain control while lessening your need for narcotics.    Neurontin/Gabapentin (neuropathic/shocking/ nerve like pain) 300mg every 8 hours - if you get too sleepy during the day, switch to nightly dosing or discontinue the use completely.     Tylenol/Acetaminophen (Pain Pill) 1000mg every 8 hours, around the clock. **NO MORE THAN 3000mg OF TYLENOL IN 24 HOURS**.    *Try to rotate these medications and not take them all at the same time, this will improve your overall pain control*           Days 6-14:    Robaxin/Methocarbamol 750mg every 6 hours  Tylenol 1000mg every 8 hours  Mobic/Meloxicam 7.5 twice a day          Days 15 and beyond:    Tylenol 1000mg three times a day, as needed  Mobic/Meloxicam 7.5mg Daily (optional, AS NEEDED)

## 2024-09-16 NOTE — ANESTHESIA POSTPROCEDURE EVALUATION
Anesthesia Post Evaluation    Patient: Katherin Johnson    Procedure(s) Performed: Procedure(s) (LRB):  REPAIR, ROTATOR CUFF, ARTHROSCOPIC (Left)    Final Anesthesia Type: general      Patient location during evaluation: PACU  Patient participation: Yes- Able to Participate  Level of consciousness: awake and alert  Post-procedure vital signs: reviewed and stable  Pain management: adequate  Airway patency: patent    PONV status at discharge: No PONV  Anesthetic complications: no      Respiratory status: unassisted  Hydration status: euvolemic  Follow-up not needed.              Vitals Value Taken Time   /67 09/16/24 1347   Temp 36 °C (96.8 °F) 09/16/24 1157   Pulse 84 09/16/24 1357   Resp 26 09/16/24 1228   SpO2 91 % 09/16/24 1357   Vitals shown include unfiled device data.      Event Time   Out of Recovery 12:28:00         Pain/Lory Score: Pain Rating Prior to Med Admin: 8 (9/16/2024  6:56 AM)  Lory Score: 8 (9/16/2024 12:20 PM)

## 2024-09-16 NOTE — TRANSFER OF CARE
"Anesthesia Transfer of Care Note    Patient: Katherin Johnson    Procedure(s) Performed: Procedure(s) (LRB):  REPAIR, ROTATOR CUFF, ARTHROSCOPIC (Left)    Patient location: PACU    Anesthesia Type: general    Transport from OR: Transported from OR on room air with adequate spontaneous ventilation    Post pain: adequate analgesia    Post assessment: no apparent anesthetic complications    Post vital signs: stable    Level of consciousness: sedated    Nausea/Vomiting: no nausea/vomiting    Complications: none    Transfer of care protocol was followed      Last vitals: Visit Vitals  /68   Pulse 76   Temp 36 °C (96.8 °F)   Resp 16   Ht 5' 2" (1.575 m)   Wt 88.5 kg (195 lb 1.7 oz)   SpO2 97%   Breastfeeding No   BMI 35.69 kg/m²     "

## 2024-09-16 NOTE — ANESTHESIA PROCEDURE NOTES
Peripheral Block    Patient location during procedure: pre-op   Block not for primary anesthetic.  Reason for block: at surgeon's request and post-op pain management   Post-op Pain Location: left shoulder   Start time: 9/16/2024 8:30 AM  Timeout: 9/16/2024 8:30 AM   End time: 9/16/2024 8:32 AM    Staffing  Authorizing Provider: Marcell Lieberman MD  Performing Provider: Marcell Lieberman MD    Staffing  Performed by: Marcell Lieberman MD  Authorized by: Marcell Lieberman MD    Preanesthetic Checklist  Completed: patient identified, IV checked, site marked, risks and benefits discussed, surgical consent, monitors and equipment checked, pre-op evaluation and timeout performed  Peripheral Block  Patient position: sitting  Prep: ChloraPrep  Patient monitoring: heart rate, cardiac monitor, continuous pulse ox, continuous capnometry and frequent blood pressure checks  Block type: interscalene  Laterality: left  Injection technique: single shot  Needle  Needle type: Stimuplex   Needle gauge: 20 G  Needle length: 4 in  Needle localization: anatomical landmarks and ultrasound guidance   -ultrasound image captured on disc.  Assessment  Injection assessment: negative aspiration, negative parasthesia and local visualized surrounding nerve  Paresthesia pain: none  Heart rate change: no  Slow fractionated injection: yes  Pain Tolerance: comfortable throughout block  Medications:    Medications: ropivacaine (NAROPIN) injection 0.5% - Perineural   30 mL - 9/16/2024 8:32:00 AM    Additional Notes  Good view of C5-C7 between anterior and middle scalene muscles, needle guided adjacent to plexus and local dosed without paresthesias.  Good perineural spread noted, no complications.  VSS.  [facility] RN monitoring vitals throughout procedure.  Patient tolerated procedure well.

## 2024-09-16 NOTE — ANESTHESIA PROCEDURE NOTES
Intubation    Date/Time: 9/16/2024 10:13 AM    Performed by: Jarret Scales CRNA  Authorized by: Marcell Lieberman MD    Intubation:     Induction:  Intravenous    Intubated:  Postinduction    Mask Ventilation:  Easy mask    Attempts:  1    Attempted By:  CRNA    Method of Intubation:  Direct    Blade:  Johnson 2    Laryngeal View Grade: Grade I - full view of cords      Difficult Airway Encountered?: No      Complications:  None    Airway Device:  Oral endotracheal tube    Airway Device Size:  7.5    Style/Cuff Inflation:  Cuffed (inflated to minimal occlusive pressure)    Inflation Amount (mL):  6    Tube secured:  22    Secured at:  The lips    Placement Verified By:  Capnometry    Complicating Factors:  None    Findings Post-Intubation:  BS equal bilateral and atraumatic/condition of teeth unchanged

## 2024-09-16 NOTE — ANESTHESIA PREPROCEDURE EVALUATION
09/16/2024  Katherin Johnson is a 54 y.o., female.    H/h ok, lytes nl  Abnormal stress in 2022 with subsequent nl LHC, nl LVEF    Pre-op Assessment    I have reviewed the Patient Summary Reports.     I have reviewed the Nursing Notes. I have reviewed the NPO Status.   I have reviewed the Medications.     Review of Systems  Anesthesia Hx:  No problems with previous Anesthesia             Denies Family Hx of Anesthesia complications.    Denies Personal Hx of Anesthesia complications.                    Hematology/Oncology:  Hematology Normal                                     Cardiovascular:     Hypertension           hyperlipidemia                             Pulmonary:  Pulmonary Normal                       Renal/:  Renal/ Normal                 Hepatic/GI:  Hepatic/GI Normal                 Musculoskeletal:  Arthritis               Neurological:  Neurology Normal                                      Endocrine:        Obesity / BMI > 30  Psych:    depression                Physical Exam  General: Well nourished    Airway:  Mallampati: I   Mouth Opening: Normal  TM Distance: Normal  Tongue: Normal  Neck ROM: Normal ROM    Dental:  Intact, Caps / Implants        Anesthesia Plan  Type of Anesthesia, risks & benefits discussed:    Anesthesia Type: Gen ETT  Intra-op Monitoring Plan: Standard ASA Monitors  Post Op Pain Control Plan: multimodal analgesia and peripheral nerve block  Induction:  IV  Informed Consent: Informed consent signed with the Patient and all parties understand the risks and agree with anesthesia plan.  All questions answered.   ASA Score: 3  Day of Surgery Review of History & Physical: H&P Update referred to the surgeon/provider.    Ready For Surgery From Anesthesia Perspective.     .

## 2024-09-16 NOTE — OP NOTE
09/16/2024    PRIMARY SURGEON: Bernabe Pack MD    ASSISTANT: Rona Pedroza NP was imperative to the critical parts of the surgical case.  This included the surgical approach and dissection, retraction, placement of hardware and implants, and closure.    PREOPERATIVE DIAGNOSIS:  1.  Left rotator cuff tear  2.  Acromioclavicular arthrosis    POSTOPERATIVE DIAGNOSIS:  1.  Left rotator cuff tear  2.  Acromioclavicular arthrosis  3.  Intra-articular biceps tendon tear and medial subluxation    PROCEDURES:  1.  Left diagnostic shoulder arthroscopy  2.  Arthroscopic biceps tenotomy  3.  Distal clavicle resection  4.  Arthroscopic rotator cuff repair with PRP (platelet rich plasma) injection    ANESTHESIA:  General anesthesia with supraclavicular nerve block    BLOOD LOSS:  Less than 10 cc    DVT PROPHYLAXIS:  Aspirin for 4-6 weeks    OUTCOME:  Patient tolerated treatment/procedure well without complications and is now ready for discharge.    DISPOSITION: Home/SelfCare    FOLLOW UP: In clinic    INSTRUMENTATION:  Subscapularis repair: Arthrex 4.75 mm BioComposite SwiveLock anchor   Rotator cuff repair:  5.5 mm self punching SwiveLock anchor for lateral row repair  Implant Name Type Inv. Item Serial No.  Lot No. LRB No. Used Action   ANCHOR SUTURE SWIVILOK 19.1MM - [id number]  ANCHOR SUTURE SWIVILOK 19.1MM  [employer] [id number] Left 1 Implanted   ANCHOR SWIVELOCK KL 5.5X24.5MM - [id number]  ANCHOR SWIVELOCK KL 5.5X24.5MM  [employer] [id number] Left 1 Implanted       PROCEDURE IN DETAIL:    Diagnostic arthroscopy of shoulder    The examination under anesthesia was performed for skin defects and other contraindications and none were found.The patient was carefully placed in a lateral decubitus position. All bony prominences were padded and sterile U-drape was applied. Patients operative extremity was placed in suspension device with 7-10lbs of weight applied. The skin was prepped in normal sterile fashion. A time out  was performed to confirm correct operative extremity, allergies, and antibiotic infusion. All portals were made with an 11-blade and an 18-gauge spinal needle was used to help probe and find proper alignment for the portals. After creating posterior portal, an arthroscope was inserted into the glenohumeral joint. A diagnostic arthroscopy was then performed in this sequential order: biceps anchor, rotator interval, subscapularis tendon, superior glenohumeral ligament, middle glenohumeral ligament, inferior glenohumeral ligament, anterior and inferior capsular attachments, anterior, inferior, and posterior labrum, teres minor tendon, infraspinatus tendon, and supraspinatus tendon, and biceps tendon in the rotator interval.    The certified assistant provided critical assistance by holding instruments including the arthroscope to provide adequate visualization of the procedure, as well as assisting in wound closure.    At the end of the procedure the portals were closed with mastisol around the wound and placement of steri-strips. The patient tolerated the procedure well and taken to the recovery room in good condition.      Arthroscopic biceps Tenotomy    Viewing through posterior portal, the base of the biceps tendon visualized and subluxed. The tendon was transected with arthroscopic scissors through the anterior portal.    Distal clavicle resection    A distal clavicle resection was then carried out. Viewing from the posterior portal, the shaver was used to debride the acromioclavicular joint and associated soft tissues through the anterior portal. Once cleared of the soft tissue, the axel was used to resect the distal end of the clavicle concentrating initially upon the inferior and distal clavicular spurs. The resection was then carried superiorly to remove the upper end of the clavicle to remove approximately 5-7mm of spurs. Then the axel was used on the acromion to remove 2-3mm of bone. The resection was then  measured with a probe to confirm the 8-10mm of bone was resected.     Arthroscopic rotator cuff repair with PRP    Viewing from the posterior portal we can visualize the rotator cuff tear laterally.  The greater tuberosity attachment for the rotator cuff was debrided to good bony base.  An anchor was placed just lateral to the articular surface of the humeral head on the greater tuberosity near the rotator cuff attachment.  Sutures were then passed through the rotator cuff just medial to the rotator cuff musculo-tendonis interval.  The sutures were then tied with a sliding locking knot.  There was good reapproximation and repair of the rotator cuff to the footprint.    I then placed an 18-gauge spinal needle into the repair site.  I removed all of the inflow and outflow tubings and injected platelet rich plasma into the repair site.

## 2024-09-18 VITALS
WEIGHT: 195.13 LBS | SYSTOLIC BLOOD PRESSURE: 102 MMHG | HEART RATE: 76 BPM | TEMPERATURE: 97 F | OXYGEN SATURATION: 95 % | RESPIRATION RATE: 19 BRPM | BODY MASS INDEX: 35.91 KG/M2 | HEIGHT: 62 IN | DIASTOLIC BLOOD PRESSURE: 67 MMHG

## 2024-09-26 DIAGNOSIS — N60.92 ATYPICAL DUCTAL HYPERPLASIA OF LEFT BREAST: Primary | ICD-10-CM

## 2024-09-26 RX ORDER — ANASTROZOLE 1 MG/1
1 TABLET ORAL DAILY
Qty: 17 TABLET | Refills: 0 | Status: SHIPPED | OUTPATIENT
Start: 2024-09-26

## 2024-10-03 ENCOUNTER — OFFICE VISIT (OUTPATIENT)
Dept: ORTHOPEDICS | Facility: CLINIC | Age: 54
End: 2024-10-03
Payer: COMMERCIAL

## 2024-10-03 VITALS
BODY MASS INDEX: 35.91 KG/M2 | DIASTOLIC BLOOD PRESSURE: 90 MMHG | WEIGHT: 195.13 LBS | HEART RATE: 66 BPM | SYSTOLIC BLOOD PRESSURE: 157 MMHG | HEIGHT: 62 IN

## 2024-10-03 DIAGNOSIS — Z02.6 ENCOUNTER RELATED TO WORKER'S COMPENSATION CLAIM: ICD-10-CM

## 2024-10-03 DIAGNOSIS — Z98.890 STATUS POST LEFT ROTATOR CUFF REPAIR: Primary | ICD-10-CM

## 2024-10-03 RX ORDER — OXYCODONE AND ACETAMINOPHEN 5; 325 MG/1; MG/1
1 TABLET ORAL EVERY 8 HOURS PRN
Qty: 21 TABLET | Refills: 0 | Status: SHIPPED | OUTPATIENT
Start: 2024-10-03 | End: 2024-10-10

## 2024-10-03 RX ORDER — GABAPENTIN 600 MG/1
600 TABLET ORAL NIGHTLY
Qty: 30 TABLET | Refills: 0 | Status: SHIPPED | OUTPATIENT
Start: 2024-10-03

## 2024-10-03 NOTE — LETTER
October 3, 2024    Katherin Johnson  [address] [address]          [facility]  [address] [facility], [address]  [place] [address]  Phone: [phone]  Fax: [phone] October 3, 2024     Patient: Katherin Johnson   YOB: 1970   Date of Visit: 10/3/2024       To Whom It May Concern:    It is my medical opinion that Katherin Johnson should remain out of work until released. Her follow up for re-evaluation is 11/14/2024 .    If you have any questions or concerns, please don't hesitate to call.    Sincerely,        Bernabe Pack MD

## 2024-10-03 NOTE — PROGRESS NOTES
[facility] Clinic  Orthopedic Clinic Note      Chief Complaint:   Chief Complaint   Patient presents with    Post-op Evaluation     2 wks work comp LT RTC repair sx 24- Reports radiating pain from shoulder into shoulder blade and deltoid. Stated had swelling in the beginning but has come down. Denies any numbness or tingling. Is wearing post-op sling. Does not have any more pain medication and would like a refill if possible.      Referring Physician: No ref. provider found      History of Present Illness:    2024  1.  Left diagnostic shoulder arthroscopy  2.  Arthroscopic biceps tenotomy  3.  Distal clavicle resection  4.  Arthroscopic rotator cuff repair with PRP (platelet rich plasma) injection  10/03/2024 this patient presents approximately 2 weeks status post the above-noted procedure. She has been having pain in shoulder. Has been wearing her post op sling.       Past Medical History:   Diagnosis Date    Anxiety     Breast lump or mass     bilat    Hypertension     Insomnia     Insomnia     Rotator cuff tear        Past Surgical History:   Procedure Laterality Date    ARTHROSCOPIC REPAIR OF ROTATOR CUFF OF SHOULDER Left 2024    Procedure: REPAIR, ROTATOR CUFF, ARTHROSCOPIC;  Surgeon: Bernabe Pack MD;  Location: [facility];  Service: Orthopedics;  Laterality: Left;  Arthrex, lateral shoulder traction, bean bag, block     SECTION      I have 4 :  ,,,    COLONOSCOPY      COSMETIC SURGERY      Had a tummy tuck    EXCISION, MASS, BREAST, USING RADIOLOGICAL MARKER Bilateral 2024    Procedure: EXCISION,MASS,BREAST,USING RADIOLOGICAL MARKER - [place] Localization - BILATERAL Need Sentimag Need Faxitron Need sterile charms;  Surgeon: Myranda Breaux MD;  Location: [facility];  Service: General;  Laterality: Bilateral;  Need Sentimag  Need Faxitron  Need sterile charms    HYSTERECTOMY      Had an hysterectomy in that year    OOPHORECTOMY  2016    When  "i bailee my hysterectomy    SPINE SURGERY  2012 2012 Dr.Jason Mckeon, plate in neck with fusion    WISDOM TOOTH EXTRACTION         Current Outpatient Medications   Medication Sig    dextroamphetamine-amphetamine (ADDERALL) 20 mg tablet Take 1 tablet by mouth 2 (two) times daily.    EScitalopram oxalate (LEXAPRO) 20 MG tablet Take 20 mg by mouth every evening.    losartan-hydrochlorothiazide 50-12.5 mg (HYZAAR) 50-12.5 mg per tablet Take 1 tablet by mouth 2 (two) times a day.    venlafaxine (EFFEXOR-XR) 37.5 MG 24 hr capsule Take 2 capsules (75 mg total) by mouth once daily.    zolpidem (AMBIEN) 10 mg Tab Take 10 mg by mouth every evening.    anastrozole (ARIMIDEX) 1 mg Tab Take 1 tablet (1 mg total) by mouth once daily. (Patient not taking: Reported on 10/3/2024.)    gabapentin (NEURONTIN) 600 MG tablet Take 1 tablet (600 mg total) by mouth nightly.    oxyCODONE-acetaminophen (PERCOCET) 5-325 mg per tablet Take 1 tablet by mouth every 8 (eight) hours as needed for Pain.     No current facility-administered medications for this visit.       Review of patient's allergies indicates:  No Known Allergies    Family History   Problem Relation Name Age of Onset    Breast cancer Paternal Grandmother Karoline Mckee 80    Heart failure Mother Sarah Johnson         Triple bypass    Heart disease Mother Sarah Johnson     Cancer Father Greta Johnson Sr.         Throat cancer       Social History     Socioeconomic History    Marital status:    Tobacco Use    Smoking status: Never    Smokeless tobacco: Never    Tobacco comments:     N/A   Substance and Sexual Activity    Alcohol use: Never    Drug use: Never    Sexual activity: Yes     Partners: Male     Birth control/protection: None           Review of Systems:  All review of systems negative except for those stated in the HPI.    Examination:    Vital Signs:    Vitals:    10/03/24 1002   BP: (!) 157/90   Pulse: 66   Weight: 88.5 kg (195 lb 1.7 oz)   Height: 5' 2" (1.575 m) "   PainSc:   9       Body mass index is 35.69 kg/m².    Physical Examination:  General: Well-developed, well-nourished.  Neuro: Alert and oriented x 3.  Psych: Normal mood and affect.  Card: Regular rate and rhythm.  Resp: Unlabored and quiet.  Incisions clean, dry, and intact. No signs of infection. Neurovascular intact distally. Sensation intact.     Imaging: none    Assessment: Status post left rotator cuff repair  -     oxyCODONE-acetaminophen (PERCOCET) 5-325 mg per tablet; Take 1 tablet by mouth every 8 (eight) hours as needed for Pain.  Dispense: 21 tablet; Refill: 0  -     gabapentin (NEURONTIN) 600 MG tablet; Take 1 tablet (600 mg total) by mouth nightly.  Dispense: 30 tablet; Refill: 0    Encounter related to worker's compensation claim        Plan:  I reviewed the arthroscopic videos with the patient and fully explained surgical procedure.  Patient will continue with physical therapy.  I refilled her gabapentin and also her Percocet.  I will see her back in 6 weeks. Patient acknowledges their understanding and agreement with the plan.             Bernabe Pack MD personally performed the services described in this documentation, including but not limited to patient's history, physical examination, and assessment and plan of care. All medical record entries made by Karrie Yun ATC, OTC were performed at his direction and in his presence. The medical record was reviewed and is accurate and complete.       No follow-ups on file.      DISCLAIMER: This note may have been dictated using voice recognition software and may contain grammatical errors.     NOTE: Consult report sent to referring provider via [employer].

## 2024-10-21 DIAGNOSIS — Z98.890 STATUS POST LEFT ROTATOR CUFF REPAIR: Primary | ICD-10-CM

## 2024-10-21 RX ORDER — GABAPENTIN 300 MG/1
300 CAPSULE ORAL EVERY 8 HOURS
Qty: 15 CAPSULE | Refills: 0 | Status: CANCELLED | OUTPATIENT
Start: 2024-10-21 | End: 2024-10-26

## 2024-10-21 RX ORDER — METHOCARBAMOL 750 MG/1
750 TABLET, FILM COATED ORAL 3 TIMES DAILY PRN
Qty: 42 TABLET | Refills: 0 | Status: SHIPPED | OUTPATIENT
Start: 2024-10-21 | End: 2024-11-04

## 2024-10-21 RX ORDER — GABAPENTIN 300 MG/1
300 CAPSULE ORAL 3 TIMES DAILY
Qty: 15 CAPSULE | Refills: 0 | Status: SHIPPED | OUTPATIENT
Start: 2024-10-21 | End: 2024-10-26

## 2024-10-21 RX ORDER — OXYCODONE AND ACETAMINOPHEN 5; 325 MG/1; MG/1
1 TABLET ORAL EVERY 8 HOURS PRN
Qty: 21 TABLET | Refills: 0 | Status: SHIPPED | OUTPATIENT
Start: 2024-10-21 | End: 2024-10-28

## 2024-10-23 ENCOUNTER — OFFICE VISIT (OUTPATIENT)
Dept: HEMATOLOGY/ONCOLOGY | Facility: CLINIC | Age: 54
End: 2024-10-23
Payer: COMMERCIAL

## 2024-10-23 ENCOUNTER — LAB VISIT (OUTPATIENT)
Dept: LAB | Facility: HOSPITAL | Age: 54
End: 2024-10-23
Attending: STUDENT IN AN ORGANIZED HEALTH CARE EDUCATION/TRAINING PROGRAM

## 2024-10-23 VITALS
SYSTOLIC BLOOD PRESSURE: 166 MMHG | HEART RATE: 94 BPM | HEIGHT: 62 IN | WEIGHT: 210.19 LBS | BODY MASS INDEX: 38.68 KG/M2 | DIASTOLIC BLOOD PRESSURE: 98 MMHG | RESPIRATION RATE: 18 BRPM | OXYGEN SATURATION: 97 % | TEMPERATURE: 98 F

## 2024-10-23 DIAGNOSIS — N60.92 ATYPICAL DUCTAL HYPERPLASIA OF LEFT BREAST: ICD-10-CM

## 2024-10-23 DIAGNOSIS — N95.1 HOT FLASHES DUE TO MENOPAUSE: ICD-10-CM

## 2024-10-23 DIAGNOSIS — N60.92 ATYPICAL LOBULAR HYPERPLASIA (ALH) OF LEFT BREAST: ICD-10-CM

## 2024-10-23 LAB
ALBUMIN SERPL-MCNC: 3.6 G/DL (ref 3.5–5)
ALBUMIN/GLOB SERPL: 1.2 RATIO (ref 1.1–2)
ALP SERPL-CCNC: 81 UNIT/L (ref 40–150)
ALT SERPL-CCNC: 22 UNIT/L (ref 0–55)
ANION GAP SERPL CALC-SCNC: 10 MEQ/L
AST SERPL-CCNC: 16 UNIT/L (ref 5–34)
BASOPHILS # BLD AUTO: 0.04 X10(3)/MCL
BASOPHILS NFR BLD AUTO: 0.6 %
BILIRUB SERPL-MCNC: 0.1 MG/DL
BUN SERPL-MCNC: 13.5 MG/DL (ref 9.8–20.1)
CALCIUM SERPL-MCNC: 9.2 MG/DL (ref 8.4–10.2)
CHLORIDE SERPL-SCNC: 110 MMOL/L (ref 98–107)
CO2 SERPL-SCNC: 26 MMOL/L (ref 22–29)
CREAT SERPL-MCNC: 0.91 MG/DL (ref 0.55–1.02)
CREAT/UREA NIT SERPL: 15
EOSINOPHIL # BLD AUTO: 0.33 X10(3)/MCL (ref 0–0.9)
EOSINOPHIL NFR BLD AUTO: 4.9 %
ERYTHROCYTE [DISTWIDTH] IN BLOOD BY AUTOMATED COUNT: 13.9 % (ref 11.5–17)
GFR SERPLBLD CREATININE-BSD FMLA CKD-EPI: >60 ML/MIN/1.73/M2
GLOBULIN SER-MCNC: 3.1 GM/DL (ref 2.4–3.5)
GLUCOSE SERPL-MCNC: 137 MG/DL (ref 74–100)
HCT VFR BLD AUTO: 40.1 % (ref 37–47)
HGB BLD-MCNC: 12.8 G/DL (ref 12–16)
IMM GRANULOCYTES # BLD AUTO: 0 X10(3)/MCL (ref 0–0.04)
IMM GRANULOCYTES NFR BLD AUTO: 0 %
LYMPHOCYTES # BLD AUTO: 3.44 X10(3)/MCL (ref 0.6–4.6)
LYMPHOCYTES NFR BLD AUTO: 51.4 %
MCH RBC QN AUTO: 28.9 PG (ref 27–31)
MCHC RBC AUTO-ENTMCNC: 31.9 G/DL (ref 33–36)
MCV RBC AUTO: 90.5 FL (ref 80–94)
MONOCYTES # BLD AUTO: 0.62 X10(3)/MCL (ref 0.1–1.3)
MONOCYTES NFR BLD AUTO: 9.3 %
NEUTROPHILS # BLD AUTO: 2.26 X10(3)/MCL (ref 2.1–9.2)
NEUTROPHILS NFR BLD AUTO: 33.8 %
PLATELET # BLD AUTO: 296 X10(3)/MCL (ref 130–400)
PMV BLD AUTO: 9.7 FL (ref 7.4–10.4)
POTASSIUM SERPL-SCNC: 3.6 MMOL/L (ref 3.5–5.1)
PROT SERPL-MCNC: 6.7 GM/DL (ref 6.4–8.3)
RBC # BLD AUTO: 4.43 X10(6)/MCL (ref 4.2–5.4)
SODIUM SERPL-SCNC: 146 MMOL/L (ref 136–145)
WBC # BLD AUTO: 6.69 X10(3)/MCL (ref 4.5–11.5)

## 2024-10-23 PROCEDURE — 36415 COLL VENOUS BLD VENIPUNCTURE: CPT

## 2024-10-23 PROCEDURE — 3080F DIAST BP >= 90 MM HG: CPT | Mod: CPTII,S$GLB,,

## 2024-10-23 PROCEDURE — 99215 OFFICE O/P EST HI 40 MIN: CPT | Mod: S$GLB,,,

## 2024-10-23 PROCEDURE — 1159F MED LIST DOCD IN RCRD: CPT | Mod: CPTII,S$GLB,,

## 2024-10-23 PROCEDURE — 3008F BODY MASS INDEX DOCD: CPT | Mod: CPTII,S$GLB,,

## 2024-10-23 PROCEDURE — 85025 COMPLETE CBC W/AUTO DIFF WBC: CPT

## 2024-10-23 PROCEDURE — 80053 COMPREHEN METABOLIC PANEL: CPT

## 2024-10-23 PROCEDURE — 3077F SYST BP >= 140 MM HG: CPT | Mod: CPTII,S$GLB,,

## 2024-10-23 PROCEDURE — 99999 PR PBB SHADOW E&M-EST. PATIENT-LVL IV: CPT | Mod: PBBFAC,,,

## 2024-10-23 PROCEDURE — 1160F RVW MEDS BY RX/DR IN RCRD: CPT | Mod: CPTII,S$GLB,,

## 2024-10-23 RX ORDER — ANASTROZOLE 1 MG/1
1 TABLET ORAL DAILY
Qty: 17 TABLET | Refills: 0 | Status: SHIPPED | OUTPATIENT
Start: 2024-10-23
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
## 2024-10-29 DIAGNOSIS — Z98.890 STATUS POST LEFT ROTATOR CUFF REPAIR: ICD-10-CM

## 2024-10-29 RX ORDER — GABAPENTIN 300 MG/1
300 CAPSULE ORAL 2 TIMES DAILY
Qty: 14 CAPSULE | Refills: 0 | Status: SHIPPED | OUTPATIENT
Start: 2024-10-29 | End: 2024-11-05

## 2024-10-29 RX ORDER — OXYCODONE AND ACETAMINOPHEN 5; 325 MG/1; MG/1
1 TABLET ORAL EVERY 8 HOURS PRN
Qty: 21 TABLET | Refills: 0 | Status: SHIPPED | OUTPATIENT
Start: 2024-10-29 | End: 2024-11-05

## 2024-10-30 ENCOUNTER — HOSPITAL ENCOUNTER (OUTPATIENT)
Dept: RADIOLOGY | Facility: HOSPITAL | Age: 54
Discharge: HOME | End: 2024-10-30
Attending: PHYSICIAN ASSISTANT
Payer: COMMERCIAL

## 2024-10-30 DIAGNOSIS — N60.92 ATYPICAL DUCTAL HYPERPLASIA OF LEFT BREAST: ICD-10-CM

## 2024-10-30 PROCEDURE — 77062 BREAST TOMOSYNTHESIS BI: CPT | Mod: 26,,, | Performed by: RADIOLOGY

## 2024-10-30 PROCEDURE — 77066 DX MAMMO INCL CAD BI: CPT | Mod: TC

## 2024-10-30 PROCEDURE — 77062 BREAST TOMOSYNTHESIS BI: CPT | Mod: TC

## 2024-10-30 PROCEDURE — 77066 DX MAMMO INCL CAD BI: CPT | Mod: 26,,, | Performed by: RADIOLOGY

## 2024-11-11 NOTE — PROGRESS NOTES
[facility] - Breast [place] [facility]  Breast Surgical Oncology  Follow Up Patient Office Visit - H&P      Referring Provider: No ref. provider found   PCP: Bobbi Sierra MD     Chief Complaint:   Chief Complaint   Patient presents with    Follow-up     Patient reports ongoing intermittent sharp right nipple pain, stinging, right breast tenderness, denies swelling, redness        Subjective:     Treatments:  2024 S/P bilateral excisional biopsy   Anastrozole 1 mg po daily 5/3/24- present     Interval History:  2024 - Katherin Johnson returns today for initial high risk appointment. She is doing well today. She does have questions about her surgical pathology today. She is still taking Arimidex with mild symptoms such as hot flashes. She is on Effexor for hot flashes. She currently denies any new breast complaints today. She did recently undergo bilateral diagnostic mammogram in October which did note benign postsurgical changes. Patient did have injury to her left rotator cuff a couple of months ago, and she is awaiting physical therapy currently. She denies any other significant interval changes or any changes in family history. She has no other questions or concerns today.     Her TC score was updated today (2024) and is 28.7%.     HPI:  Katherin Johnson is a 54 y.o. female who presents on 2024 for evaluation of  newly diagnosed bilateral intraductal papillomas .     A detailed patient history was obtained and reviewed. She reports right nipple discharge which prompted her imaging work-up. She describes the discharge as yellow-clear. She did not notice any lumps/masses, skin changes, or other findings.    MG breast density: Category B (scattered areas of fibroglandular tissue)     Imagin2024  BL DG MG/BL US BREAST LIMITED at [facility]-  IMPRESSION: BENIGN, ULTRASOUND SUSPICIOUS OF MALIGNANCY   A) R breast 5 o'clock subareolar 0.7 x 0.3 x 0.8 cm oval  hypoechoic circumscribed vascular intraductal mass within a mildly dilated ducts correlates with the patient's history of right nipple discharge and is suspicious.  BI-RADS 4: Suspicious.    B) L breast 3 o'clock  2 cm FN 0.6 x 0.4 x 0.5 cm oval nonparallel hypoechoic circumscribed vascular mass was incidentally discovered and is suspicious.  BI-RADS 4: Suspicious.  C) No mammographic evidence of malignancy in either breast.    10/30/2024  BL DG MG  at [facility]-   No mammographic evidence of malignancy in either breast. Interval benign postsurgical changes are noted bilaterally. BI-RADS: 2 Benign         Pathology:  3/5/2024  Ultrasound-guided Core Needle Biopsy Left Breast 3 o'clock 2 cm FN-  -Sclerosing intraductal papilloma(measuring 3.5 mm) with superimposed organizing hemorrhage  -Focal moderate to florid ductal epithelial hyperplasia of the usual type    3/5/2024  Ultrasound-guided Core Needle Biopsy Right Breast 5 o'clock SA-  -Sclerosing intraductal papilloma (measuring 2.5 mm)  - No evidence of malignancy    2024 Bilateral excisional breast biopsy  Left - intraductal papilloma, focal ADH, ALH. Proliferative fibrocystic changes. Site of prior biopsy. Negative for in situ or invasive carcinoma.  Right - intraductal papilloma, partially sclerosed. Proliferative fibrocystic changes. Site of prior biopsy. Calcifications within benign lobules. Negative for invasive or in situ carcinoma.    OB/GYN History:  Age at Menarche Onset: 16  Menopausal Status: postmenopausal, LMP: No LMP recorded. Patient is postmenopausal.  Hysterectomy/Oophorectomy: hysterectomy with BSO, at age 46  Hormonal birth control (duration): Yes OCP (estrogen/progesterone).   Pregnancy History:   Age at first live birth: 17  Hormone Replacement Therapy: Yes,  Premarin 0.9 mg (started at age 46)   Patient did not breast feed.  Patient admits to nipple discharge in right breast.   Patient admits to to previous breast biopsy.( removal of  a cyst)  Patient denies to a personal history of breast cancer.    Other Relevant History:  Prior thoracic RT: none  Genetic testing: No  Ashkenazi Religion descent: No    Family History:  Family History   Problem Relation Name Age of Onset    Breast cancer Paternal Grandmother Karoline Mckee 80    Heart failure Mother Sarah Johnson         Triple bypass    Heart disease Mother Sarah Johnson     Cancer Father Greta Johnson Sr.         Throat cancer        Past History:  Past Medical History:   Diagnosis Date    Anxiety     Breast lump or mass     bilat    Hypertension     Insomnia     Insomnia     Rotator cuff tear         Past Surgical History:   Procedure Laterality Date    ARTHROSCOPIC REPAIR OF ROTATOR CUFF OF SHOULDER Left 2024    Procedure: REPAIR, ROTATOR CUFF, ARTHROSCOPIC;  Surgeon: Bernabe Pack MD;  Location: [facility] OR;  Service: Orthopedics;  Laterality: Left;  Arthrex, lateral shoulder traction, bean bag, block     SECTION      I have 4 :  ,,,    COLONOSCOPY      COSMETIC SURGERY  2018    Had a tummy tuck    EXCISION, MASS, BREAST, USING RADIOLOGICAL MARKER Bilateral 2024    Procedure: EXCISION,MASS,BREAST,USING RADIOLOGICAL MARKER - [place] [facility] - BILATERAL Need Sentimag Need Faxitron Need sterile charms;  Surgeon: Myranda Breaux MD;  Location: [facility] OR;  Service: General;  Laterality: Bilateral;  Need Sentimag  Need Faxitron  Need sterile charms    HYSTERECTOMY      Had an hysterectomy in that year    OOPHORECTOMY  2016    When i bailee my hysterectomy    SPINE SURGERY  2012 Dr.Jason Mckeon, plate in neck with fusion    WISDOM TOOTH EXTRACTION          Social History     Socioeconomic History    Marital status:    Tobacco Use    Smoking status: Never    Smokeless tobacco: Never    Tobacco comments:     N/A   Substance and Sexual Activity    Alcohol use: Never    Drug use: Never    Sexual activity: Yes     Partners: Male     Birth  control/protection: None        Body mass index is 36 kg/m².     Allergy/Medications:   Review of patient's allergies indicates:  No Known Allergies       Current Outpatient Medications:     anastrozole (ARIMIDEX) 1 mg Tab, Take 1 tablet (1 mg total) by mouth once daily., Disp: 17 tablet, Rfl: 0    atorvastatin (LIPITOR) 20 MG tablet, Take 20 mg by mouth every evening., Disp: , Rfl:     dextroamphetamine-amphetamine 30 mg Tab, Take 1 tablet by mouth 2 (two) times daily., Disp: , Rfl:     EScitalopram oxalate (LEXAPRO) 20 MG tablet, Take 20 mg by mouth every evening., Disp: , Rfl:     gabapentin (NEURONTIN) 300 MG capsule, Take 1 capsule (300 mg total) by mouth 2 (two) times daily. for 7 days, Disp: 14 capsule, Rfl: 0    losartan-hydrochlorothiazide 50-12.5 mg (HYZAAR) 50-12.5 mg per tablet, Take 1 tablet by mouth 2 (two) times a day., Disp: , Rfl:     venlafaxine (EFFEXOR-XR) 37.5 MG 24 hr capsule, Take 2 capsules (75 mg total) by mouth once daily., Disp: 60 capsule, Rfl: 2    zolpidem (AMBIEN) 10 mg Tab, Take 10 mg by mouth every evening., Disp: , Rfl:        Review of Systems:  Review of Systems   Constitutional:  Negative for chills, fever, malaise/fatigue and weight loss.   HENT:  Negative for congestion and sore throat.    Eyes:  Negative for blurred vision and double vision.   Respiratory:  Negative for cough, shortness of breath and wheezing.    Cardiovascular:  Negative for chest pain, palpitations and leg swelling.   Gastrointestinal:  Negative for abdominal pain, blood in stool, constipation, diarrhea, heartburn, nausea and vomiting.   Genitourinary:  Negative for dysuria, flank pain, frequency, hematuria and urgency.   Musculoskeletal:  Negative for back pain, joint pain and myalgias.   Skin:  Negative for rash.   Neurological:  Negative for dizziness, tingling, sensory change, weakness and headaches.   Endo/Heme/Allergies:  Does not bruise/bleed easily.   Psychiatric/Behavioral:  Negative for depression.  "The patient is not nervous/anxious and does not have insomnia.        Objective:     Vitals:  Blood pressure (!) 151/85, pulse 83, temperature 97.9 °F (36.6 °C), temperature source Oral, resp. rate 20, height 5' 2" (1.575 m), weight 89.3 kg (196 lb 12.8 oz), SpO2 97%.      Physical Exam:  General: The patient is awake, alert and oriented times three. The patient is well nourished and in no acute distress.  Neck: There is no evidence of palpable cervical, supraclavicular or axillary adenopathy. The neck is supple. The thyroid is not enlarged.  Musculoskeletal: The patient has a normal range of motion of her bilateral upper extremities.  Chest: Examination of the chest wall fails to reveal any obvious abnormalities.  The lungs are clear to auscultation bilaterally without rales, rhonchi, or wheezing.  Cardiovascular: The heart has a regular rate and rhythm without murmurs, gallops or rubs.  Breast:   Right:  Examination of right breast fails to reveal any dominant masses or areas of significant focal nodularity. The nipple is inverted (has been this way for years) without evidence of discharge. There is no skin dimpling with movement of the pectoralis. There is no significant skin changes overlying the breast.   Left:  Examination of the left breast fails to reveal any dominant masses or areas of significant focal nodularity. The nipple is everted without evidence of discharge. There is no skin dimpling with movement of the pectoralis. There are no significant skin changes overlying the breast.  Abdomen: The abdomen is soft, flat, nontender and nondistended with no palpable masses or organomegaly.  Integumentary: no rashes or skin lesions present  Neurologic: cranial nerves intact, no signs of peripheral neurological deficit, motor/sensory function intact      Assessment and Discussion:       Encounter Diagnoses   Name Primary?    Atypical ductal hyperplasia of left breast Yes    At high risk for breast cancer     " Family history of breast cancer     Screening mammogram for breast cancer     Atypical lobular hyperplasia (ALH) of left breast     Intraductal papilloma of both breasts        --------------------------------------------------------------------------------------------------------------  After the initial clinical evaluation nearly 30 minutes were on counseling the patients regarding the options for management. Risk reduction strategies were discussed.     1. Lifestyle factors: As with other types of cancer, studies continue to show that various lifestyle factors may contribute to the development of breast cancer.     Weight: Recent studies have shown that postmenopausal women who are overweight or obese have an increased risk of breast cancer. These women also have a higher risk of having the cancer come back after treatment.     Physical activity: Decreased physical activity is associated with an increased risk of developing breast cancer and a higher risk of having the cancer come back after treatment. Regular physical activity may protect against breast cancer by helping women maintain a healthy body weight, lowering hormone levels, or causing changes in a womens metabolism or immune factors.     Alcohol: Current research suggests that having more than 1 to 2 alcoholic drinks, including beer, wine, and spirits, per day raises the risk of breast cancer, as well as the risk of having the cancer come back after treatment.     Food: There is no reliable research that confirms that eating or avoiding specific foods reduces the risk of developing breast cancer or having the cancer come back after treatment. However, eating more fruits and vegetables and fewer animal fats is linked with many health benefits.     2. Prevention:  Surgery to lower cancer risk: For women with BRCA1 or BRCA2 genetic mutations, which substantially increase the risk of breast cancer, preventive removal of the breasts may be considered. The  "procedure, called a prophylactic mastectomy, appears to reduce the risk of developing breast cancer by at least 95%. Women with these mutations should also consider the preventive removal of the ovaries and fallopian tubes, called a prophylactic salpingo-oophorectomy. This procedure can reduce the risk of developing ovarian cancer, as well as breast cancer, by stopping the ovaries from making estrogen.      Drugs to lower cancer risk (Chemoprevention): Women who have a higher than usual risk of developing breast cancer may consider certain drugs that may help prevent breast cancer. This approach may also be called "chemoprevention." For breast cancer, this is the use of hormone-blocking drugs to reduce cancer risk. The drugs, tamoxifen (Soltamox) and raloxifene (Evista), are approved by the U.S. Food and Drug Administration (FDA) to lower breast cancer risk. These drugs are called selective estrogen receptor modulators (SERMs) and are not chemotherapy. A SERM is a medication that blocks estrogen receptors in some tissues and not others. Both women who have gone through menopause and those who have not may take tamoxifen. Raloxifene is only approved for women who have gone through menopause. Each drug also has different side effects.     Aromatase inhibitors (AIs) have also been shown to lower breast cancer risk. AIs are a type of hormone-blocking treatment that reduces the amount of estrogen in a woman's body by stopping tissues and organs other than the ovaries from producing estrogen. They can only be used by women who have gone through menopause. However, no AIs have been approved by the FDA for lowering breast cancer risk in women who do not have the disease.     3. Surveillance: Women with a known genetic mutation should follow screening guidelines per the NCCN guidelines. Women with no known genetic mutation  and found to be at greater than 20 percent average lifetime risk of breast cancer are recommended for " the following screening recommendations:     Clinical Breast exam: Every 6-12 months starting at age found to be at increased risk by risk model     Mammogram: Per NCCN guidelines, recommended every year starting 10 years younger than the youngest breast cancer case in the family (but not before age 30). May consider beginning breast MRIs at age 30 per ACR guidelines if desired by patient or other clinical considerations. May also consider getting a baseline MG at time of initial high risk consultation, if not already obtained.     Breast MRI: Per NCCN guidelines, recommended every year starting 10 years younger than the youngest breast cancer case in the family (but not before age 25). May consider beginning breast MRIs at age 30 per ACR guidelines if desired by patient or other clinical considerations. If patient has a first-degree relative with a BRCA1/2 gene mutation, youre encouraged to get genetic counseling and/or testing before getting MRI as part of screening (for those who do not wish to have genetic testing, MRI is recommended). Breast MRI in combination with mammography is better than mammography alone at finding breast cancer in certain women at higher than average risk.     --------------------------------------------------------------------------------------------------------------  Patient did have questions about surgical pathology today. Again discussed her surgical pathology with her and explained why she is taking aromatase inhibitor. Provided patient with copy of her final surgical pathology. Patient relayed her understanding.      Plan:     1. Lifestyle - Healthy lifestyle guidelines were reviewed. She was encouraged to engage in regular exercise, maintain a healthy body weight, and avoid excessive alcohol consumption. Healthy nutritional guidelines were also discussed. Self-breast examination was reviewed with the patient in detail and she was encouraged to perform this on a monthly  basis.    2. Surveillance - She desires undergoing high risk screening with annual screening mammograms and breast MRIs. In the absence of significant clinical findings in the interval, I recommend Breast MRI in April 2025 and SC MG in October 2025 . RTC in 1 year.     Of note, patient does have issues with her left rotator cuff, thus if she does not have improved ROM before breast MRI in April, may have to reschedule.     3. Prevention - Continue Arimidex. Continue following with medical oncology.     4. Genetics - According to NCCN guidelines, she does not currently meet criteria for genetic testing. Will continue to monitor.    5. Other routine screening exams:   -  Recommend annual follow up with PCP.   -  Recommend annual follow up with GYN for pap smears/gynecologic exams and CBE.   -  GI: Recommend start colorectal cancer screening at age 45 in average risk individuals. This can be done either with a sensitive test that looks for signs of cancer in a persons stool (a stool-based test), or with an exam that looks at the colon and rectum (a visual exam). Patient's at an increased risk for CRC (ex. Personal or family history of CRC, certain types of polyps, genetic disorders, IBD, or hx of abdominal radiation) should start screening prior to age 45.       All of her questions were answered. She was advised to call if she develops any questions or concerns.    Brunilda Gary PA-C       All of her questions were answered. She was advised to call if she develops any questions or concerns.    Brunilda Gary PA-C       Total time on the date of the visit ranged from 30-39 mins (86315). Total time includes both face-to-face and non-face-to-face time personally spent by myself on the day of the visit.    Non-face-to-face time included:  _X_ preparing to see the patient such as reviewing the patient record  __ obtaining and reviewing separately obtained history  _X_ independently interpreting results  _X_ documenting  clinical information in electronic health record.    Face-to-face time included:  _X_ performing an appropriate history and examination  _X_ communicating results to the patient  _X_ counseling and educating the patient  __ ordering appropriate medications  __ ordering appropriate tests  _X_ ordering appropriate procedures (including follow-up)  _X_ answering any questions the patient had    Total Time spent on date of visit: 35 minutes

## 2024-11-13 ENCOUNTER — OFFICE VISIT (OUTPATIENT)
Dept: SURGERY | Facility: CLINIC | Age: 54
End: 2024-11-13
Attending: PHYSICIAN ASSISTANT
Payer: COMMERCIAL

## 2024-11-13 VITALS
RESPIRATION RATE: 20 BRPM | WEIGHT: 196.81 LBS | TEMPERATURE: 98 F | OXYGEN SATURATION: 97 % | SYSTOLIC BLOOD PRESSURE: 151 MMHG | HEIGHT: 62 IN | DIASTOLIC BLOOD PRESSURE: 85 MMHG | HEART RATE: 83 BPM | BODY MASS INDEX: 36.22 KG/M2

## 2024-11-13 DIAGNOSIS — Z80.3 FAMILY HISTORY OF BREAST CANCER: ICD-10-CM

## 2024-11-13 DIAGNOSIS — N60.92 ATYPICAL DUCTAL HYPERPLASIA OF LEFT BREAST: Primary | ICD-10-CM

## 2024-11-13 DIAGNOSIS — D24.1 INTRADUCTAL PAPILLOMA OF BOTH BREASTS: ICD-10-CM

## 2024-11-13 DIAGNOSIS — Z91.89 AT HIGH RISK FOR BREAST CANCER: ICD-10-CM

## 2024-11-13 DIAGNOSIS — Z12.31 SCREENING MAMMOGRAM FOR BREAST CANCER: ICD-10-CM

## 2024-11-13 DIAGNOSIS — N60.92 ATYPICAL LOBULAR HYPERPLASIA (ALH) OF LEFT BREAST: ICD-10-CM

## 2024-11-13 DIAGNOSIS — D24.2 INTRADUCTAL PAPILLOMA OF BOTH BREASTS: ICD-10-CM

## 2024-11-13 PROCEDURE — 3079F DIAST BP 80-89 MM HG: CPT | Mod: CPTII,S$GLB,,

## 2024-11-13 PROCEDURE — 99999 PR PBB SHADOW E&M-EST. PATIENT-LVL IV: CPT | Mod: PBBFAC,,,

## 2024-11-13 PROCEDURE — 3008F BODY MASS INDEX DOCD: CPT | Mod: CPTII,S$GLB,,

## 2024-11-13 PROCEDURE — 1159F MED LIST DOCD IN RCRD: CPT | Mod: CPTII,S$GLB,,

## 2024-11-13 PROCEDURE — 1160F RVW MEDS BY RX/DR IN RCRD: CPT | Mod: CPTII,S$GLB,,

## 2024-11-13 PROCEDURE — 3077F SYST BP >= 140 MM HG: CPT | Mod: CPTII,S$GLB,,

## 2024-11-13 PROCEDURE — 99214 OFFICE O/P EST MOD 30 MIN: CPT | Mod: S$GLB,,,

## 2024-11-13 RX ORDER — ATORVASTATIN CALCIUM 20 MG/1
20 TABLET, FILM COATED ORAL NIGHTLY
COMMUNITY
Start: 2024-09-11

## 2024-11-13 RX ORDER — DEXTROAMPHETAMINE SACCHARATE, AMPHETAMINE ASPARTATE, DEXTROAMPHETAMINE SULFATE AND AMPHETAMINE SULFATE 7.5; 7.5; 7.5; 7.5 MG/1; MG/1; MG/1; MG/1
1 TABLET ORAL 2 TIMES DAILY
COMMUNITY
Start: 2024-09-23

## 2024-11-21 ENCOUNTER — OFFICE VISIT (OUTPATIENT)
Dept: ORTHOPEDICS | Facility: CLINIC | Age: 54
End: 2024-11-21
Payer: COMMERCIAL

## 2024-11-21 VITALS
SYSTOLIC BLOOD PRESSURE: 151 MMHG | HEART RATE: 69 BPM | WEIGHT: 194.63 LBS | HEIGHT: 62 IN | DIASTOLIC BLOOD PRESSURE: 96 MMHG | BODY MASS INDEX: 35.81 KG/M2

## 2024-11-21 DIAGNOSIS — Z02.6 ENCOUNTER RELATED TO WORKER'S COMPENSATION CLAIM: Primary | ICD-10-CM

## 2024-11-21 DIAGNOSIS — Z98.890 STATUS POST LEFT ROTATOR CUFF REPAIR: ICD-10-CM

## 2024-11-21 RX ORDER — TIZANIDINE 4 MG/1
4 TABLET ORAL EVERY 8 HOURS PRN
Qty: 50 TABLET | Refills: 0 | Status: SHIPPED | OUTPATIENT
Start: 2024-11-21

## 2024-11-21 RX ORDER — GABAPENTIN 300 MG/1
300 CAPSULE ORAL 2 TIMES DAILY
Qty: 14 CAPSULE | Refills: 0 | Status: SHIPPED | OUTPATIENT
Start: 2024-11-21 | End: 2024-11-28

## 2024-11-21 RX ORDER — MELOXICAM 15 MG/1
15 TABLET ORAL DAILY
Qty: 30 TABLET | Refills: 1 | Status: SHIPPED | OUTPATIENT
Start: 2024-11-21

## 2024-11-21 NOTE — PROGRESS NOTES
"Subjective:      Patient ID: Katherin Johnson is a 54 y.o. female.    Chief Complaint: Follow-up of the Left Shoulder (**WC** 2 month sp Left RTC repair sx on 24 GL 12/15/24, PT- [facility] never received the script for PT, present in a sling, shoulder is killing her, concerned if its locked, takes tylenol 500 TID with no relief, wants to be prescribed some medicine for pain)    HPI:     History of Present Illness    CHIEF COMPLAINT:  - Katherin presents today for a two-month post-operative follow-up after rotator cuff repair.    HPI:  Katherin presents for follow-up two months after rotator cuff repair surgery. She reports significant pain and attributes this to not having received any physical therapy since the surgery. She has been contacting the therapy facility weekly but was informed that the doctor's note had not been received. Katherin expresses frustration and concern about the lack of progress in her recovery. She is likely to have significantly reduced range of motion due to lack of movement for two months. Katherin denies having received any physical therapy since the rotator cuff repair surgery.    MEDICATIONS:  - Tylenol: for pain  - Katherin reports previously being prescribed muscle relaxers and gabapentin (referred to as "nerve pain medicine"), which were reported to be helpful.    SURGICAL HISTORY:  - Rotator cuff repair: approximately two months ago      ROS:  Musculoskeletal: +muscle pain          Past Medical History:   Diagnosis Date    Anxiety     Breast lump or mass     bilat    Hypertension     Insomnia     Insomnia     Rotator cuff tear      Past Surgical History:   Procedure Laterality Date    ARTHROSCOPIC REPAIR OF ROTATOR CUFF OF SHOULDER Left 2024    Procedure: REPAIR, ROTATOR CUFF, ARTHROSCOPIC;  Surgeon: Bernabe Pack MD;  Location: [facility];  Service: Orthopedics;  Laterality: Left;  Arthrex, lateral shoulder traction, bean bag, block     SECTION      " I have 4 :  ,,,    COLONOSCOPY      COSMETIC SURGERY  2018    Had a tummy tuck    EXCISION, MASS, BREAST, USING RADIOLOGICAL MARKER Bilateral 2024    Procedure: EXCISION,MASS,BREAST,USING RADIOLOGICAL MARKER - [place] [facility] - BILATERAL Need Sentimag Need Faxitron Need sterile charms;  Surgeon: Myranda Breaux MD;  Location: [facility] OR;  Service: General;  Laterality: Bilateral;  Need Sentimag  Need Faxitron  Need sterile charms    HYSTERECTOMY      Had an hysterectomy in that year    OOPHORECTOMY  2016    When i bailee my hysterectomy    SPINE SURGERY  2012 Dr.Jason Mckeon, plate in neck with fusion    WISDOM TOOTH EXTRACTION       Social History     Socioeconomic History    Marital status:    Tobacco Use    Smoking status: Never    Smokeless tobacco: Never    Tobacco comments:     N/A   Substance and Sexual Activity    Alcohol use: Never    Drug use: Never    Sexual activity: Yes     Partners: Male     Birth control/protection: None         Current Outpatient Medications:     anastrozole (ARIMIDEX) 1 mg Tab, Take 1 tablet (1 mg total) by mouth once daily., Disp: 17 tablet, Rfl: 0    atorvastatin (LIPITOR) 20 MG tablet, Take 20 mg by mouth every evening., Disp: , Rfl:     dextroamphetamine-amphetamine 30 mg Tab, Take 1 tablet by mouth 2 (two) times daily., Disp: , Rfl:     EScitalopram oxalate (LEXAPRO) 20 MG tablet, Take 20 mg by mouth every evening., Disp: , Rfl:     losartan-hydrochlorothiazide 50-12.5 mg (HYZAAR) 50-12.5 mg per tablet, Take 1 tablet by mouth 2 (two) times a day., Disp: , Rfl:     venlafaxine (EFFEXOR-XR) 37.5 MG 24 hr capsule, Take 2 capsules (75 mg total) by mouth once daily., Disp: 60 capsule, Rfl: 2    zolpidem (AMBIEN) 10 mg Tab, Take 10 mg by mouth every evening., Disp: , Rfl:     gabapentin (NEURONTIN) 300 MG capsule, Take 1 capsule (300 mg total) by mouth 2 (two) times daily. for 7 days, Disp: 14 capsule, Rfl: 0    meloxicam (MOBIC)  "15 MG tablet, Take 1 tablet (15 mg total) by mouth once daily. Take with food, Disp: 30 tablet, Rfl: 1    tiZANidine (ZANAFLEX) 4 MG tablet, Take 1 tablet (4 mg total) by mouth every 8 (eight) hours as needed (muscle spasms)., Disp: 50 tablet, Rfl: 0  Review of patient's allergies indicates:  No Known Allergies    BP (!) 151/96 (BP Location: Right arm, Patient Position: Sitting) Comment: didnt take BP med this morning  Pulse 69   Ht 5' 2" (1.575 m)   Wt 88.3 kg (194 lb 9.6 oz)   BMI 35.59 kg/m²     Comprehensive review of systems completed and negative except as per HPI.        Objective:   General: Well-developed, well-nourished.  Neuro: Alert and oriented x 3.  Psych: Normal mood and affect.  Card: Regular rate and rhythm  Resp: Respirations regular and unlabored    Left shoulder exam:  Forward flexion to 50°,3/5 strength.      Assessment:         1. Encounter related to worker's compensation claim    2. Status post left rotator cuff repair          Plan:       Orders Placed This Encounter    gabapentin (NEURONTIN) 300 MG capsule    meloxicam (MOBIC) 15 MG tablet    tiZANidine (ZANAFLEX) 4 MG tablet        Imaging and exam findings discussed.     Assessment & Plan    M75.101 Unspecified rotator cuff tear or rupture of right shoulder, not specified as traumatic  Z47.89 Encounter for other orthopedic aftercare  Z96.611 Presence of right artificial shoulder joint  Z53.8 Procedure and treatment not carried out for other reasons  Z79.891 Long term (current) use of opiate analgesic  Z79.82 Long term (current) use of aspirin    -I am highly upset that this patient has not been in physical therapy.  I explained to her that she needs to contact us directly if she has any issues moving forward with physical therapy.    MEDICATIONS PRESCRIBED:  - Prescribed muscle relaxer, gabapentin for nerve pain, and Mobic (meloxicam) for pain management.    REFERRALS:  - Referred to [facility] in [place] for physical " therapy following rotator cuff repair.    FOLLOW UP:  - Follow up in 6 weeks to assess progress with physical therapy and medication regimen.               All questions were answered. Patient happy and in agreement with the plan.     This note was generated with the assistance of ambient listening technology. Verbal consent was obtained by the patient and accompanying visitor(s) for the recording of patient appointment to facilitate this note. I attest to having reviewed and edited the generated note for accuracy, though some syntax or spelling errors may persist. Please contact the author of this note for any clarification.

## 2024-12-18 ENCOUNTER — TELEPHONE (OUTPATIENT)
Dept: ORTHOPEDICS | Facility: CLINIC | Age: 54
End: 2024-12-18
Payer: COMMERCIAL

## 2024-12-18 NOTE — TELEPHONE ENCOUNTER
----- Message from FLO Youngblood sent at 12/16/2024 11:57 AM CST -----  Regarding: RE: Update on Physical Therapy  Thanks for the update.  I still do not see how we can provide a letter addressing work status without evaluated her again.  ----- Message -----  From: Vera Corbin LPN  Sent: 12/16/2024   9:28 AM CST  To: FLO Dietrich; Bernabe Pack MD  Subject: Update on Physical Therapy                       There were questions on why she hadn't continued with physical therapy post surgery. We finally got this answer from work comp provider.      Kamlesh Yen LPN Ordonez, Kelly, LPN  Caller: Unspecified (1 week ago)  I spoke with the .  As of 12/04, this claim has been settled.  No further treatment will be authorized by [facility] after this date.  Let me know if you have any questions concerning.    Are you able to provide a letter addressing her work status?    Favian Ness  ----- Message -----  From: Kamlesh Yen LPN  Sent: 12/13/2024  12:41 PM CST  To: Vera Corbin LPN    I spoke with the .  As of 12/04, this claim has been settled.  No further treatment will be authorized by [facility] after this date.  Let me know if you have any questions concerning.    Are you able to provide a letter addressing her work status?    Favian Ness  ----- Message -----  From: Vera Corbin LPN  Sent: 12/13/2024  11:44 AM CST  To: Kamlesh Yen LPN    We do not do 1010's for therapy. The therapy place does them.  ----- Message -----  From: Kamlesh Yen LPN  Sent: 12/13/2024  11:28 AM CST  To: Vera Corbin LPN    Thank you.  Are you able to generate a letter stating such?    For the therapy, when was the 1010 submitted for therapy?  I didn't see a copy in the chart.    [place]  ----- Message -----  From: Vera Corbin LPN  Sent: 12/11/2024  11:33 AM CST  To: Kamlesh Yen LPN    She is to remain out of work at this time. Also, Gisela Jesus actually called us yesterday to  let us know patient only able to do initial eval and never received approval for additional visits. I did attempt to reach out to the patient yesterday to get an update however she did not answer and we were in clinic. I was unable to call the  to get any info. Are you able to ask the  for expedited review of whatever PT clinic sent?  ----- Message -----  From: Kamlesh Yen LPN  Sent: 12/11/2024  11:24 AM CST  To: Vera Corbin LPN    See below.  Let me know how I can assist with this patient's care.    [place]  ----- Message -----  From: Bernabe Pack MD  Sent: 12/11/2024  10:06 AM CST  To: RADHA Rodriguez  ----- Message -----  From: Kamlesh Yen LPN  Sent: 12/6/2024  10:30 AM CST  To: Bernabe Pack MD    What's Vera's last name?  I'll contact her.    Grover  ----- Message -----  From: Bernabe Pack MD  Sent: 12/6/2024   8:34 AM CST  To: Kamlesh Yen LPN    I have no idea.  I would reach out to my nurse Vera for clarification.  ----- Message -----  From: Kamlesh Yen LPN  Sent: 12/5/2024   3:37 PM CST  To: Bernabe Pack MD    The  is asking about a work status from the 11/21 encounter.    I see in the note, there's an issue with the therapy.  How can I assist?    [place]

## 2024-12-23 ENCOUNTER — TELEPHONE (OUTPATIENT)
Dept: ORTHOPEDICS | Facility: CLINIC | Age: 54
End: 2024-12-23
Payer: COMMERCIAL

## 2024-12-23 NOTE — TELEPHONE ENCOUNTER
----- Message from Bernabe Pack MD sent at 12/23/2024 10:56 AM CST -----  Regarding: RE: Update on Physical Therapy  Continue to reach out to this patient to see if she can use her commercial insurance to resume physical therapy.  ----- Message -----  From: Vera Corbin LPN  Sent: 12/23/2024   8:23 AM CST  To: Bernabe Pack MD  Subject: RE: Update on Physical Therapy                   No. I have called the patient several times to confirm and to ask if she'll be using her private insurance however she has not returned my call.  ----- Message -----  From: Bernabe Pack MD  Sent: 12/20/2024  11:06 AM CST  To: Vera Corbin LPN; FLO Dietrich  Subject: RE: Update on Physical Therapy                   So work comp is no longer approving any PT sessions?  ----- Message -----  From: Vera Corbin LPN  Sent: 12/16/2024   9:28 AM CST  To: FLO Dietrich; Bernabe Pack MD  Subject: Update on Physical Therapy                       There were questions on why she hadn't continued with physical therapy post surgery. We finally got this answer from work comp provider.      Kamlesh Yen LPN Ordonez, Kelly, LPN  Caller: Unspecified (1 week ago)  I spoke with the .  As of 12/04, this claim has been settled.  No further treatment will be authorized by [facility] after this date.  Let me know if you have any questions concerning.    Are you able to provide a letter addressing her work status?    Thanks  [place]ver  ----- Message -----  From: Kamlesh Yen LPN  Sent: 12/13/2024  12:41 PM CST  To: Vera Corbin LPN    I spoke with the .  As of 12/04, this claim has been settled.  No further treatment will be authorized by [facility] after this date.  Let me know if you have any questions concerning.    Are you able to provide a letter addressing her work status?    Favian Ness  ----- Message -----  From: Vera Corbin LPN  Sent: 12/13/2024  11:44 AM CST  To: Kamlesh Yen LPN    We do not do  1010's for therapy. The therapy place does them.  ----- Message -----  From: Kamlesh Yen LPN  Sent: 12/13/2024  11:28 AM CST  To: Vera Corbin LPN    Thank you.  Are you able to generate a letter stating such?    For the therapy, when was the 1010 submitted for therapy?  I didn't see a copy in the chart.    [place]  ----- Message -----  From: Vera Corbin LPN  Sent: 12/11/2024  11:33 AM CST  To: Kamlesh Yen LPN    She is to remain out of work at this time. Also, Gisela Jesus actually called us yesterday to let us know patient only able to do initial eval and never received approval for additional visits. I did attempt to reach out to the patient yesterday to get an update however she did not answer and we were in clinic. I was unable to call the  to get any info. Are you able to ask the  for expedited review of whatever PT clinic sent?  ----- Message -----  From: Kamlesh Yen LPN  Sent: 12/11/2024  11:24 AM CST  To: Vera Corbin LPN    See below.  Let me know how I can assist with this patient's care.    [place]  ----- Message -----  From: Bernabe Pack MD  Sent: 12/11/2024  10:06 AM CST  To: RADHA Rodriguez  ----- Message -----  From: Kamlesh Yen LPN  Sent: 12/6/2024  10:30 AM CST  To: Bernabe Pack MD    What's Vera's last name?  I'll contact her.    [place]  ----- Message -----  From: Bernabe Pack MD  Sent: 12/6/2024   8:34 AM CST  To: Kamlesh Yen LPN    I have no idea.  I would reach out to my nurse Vera for clarification.  ----- Message -----  From: Kamlesh Yen LPN  Sent: 12/5/2024   3:37 PM CST  To: Bernabe Pack MD    The  is asking about a work status from the 11/21 encounter.    I see in the note, there's an issue with the therapy.  How can I assist?    [place]

## 2025-07-24 ENCOUNTER — TELEPHONE (OUTPATIENT)
Dept: SURGERY | Facility: CLINIC | Age: 55
End: 2025-07-24
Payer: COMMERCIAL

## 2025-07-25 ENCOUNTER — PATIENT MESSAGE (OUTPATIENT)
Dept: SURGERY | Facility: CLINIC | Age: 55
End: 2025-07-25
Payer: COMMERCIAL

## 2025-07-25 ENCOUNTER — TELEPHONE (OUTPATIENT)
Dept: SURGERY | Facility: CLINIC | Age: 55
End: 2025-07-25
Payer: COMMERCIAL

## 2025-08-20 ENCOUNTER — TELEPHONE (OUTPATIENT)
Dept: SURGERY | Facility: CLINIC | Age: 55
End: 2025-08-20
Payer: COMMERCIAL

## (undated) DEVICE — GOWN POLY REINF X-LONG 2XL

## (undated) DEVICE — SUT FIBERWIRE TIGER 2M

## (undated) DEVICE — DRESSING TRANS 4X4 TEGADERM

## (undated) DEVICE — STRIP MEDI WND CLSR 1/2X4IN

## (undated) DEVICE — Device

## (undated) DEVICE — SPONGE LAP 18X18 PREWASHED

## (undated) DEVICE — BLADE COOLCUT EXCALIBER 4X13

## (undated) DEVICE — HOOK APOLLORF NON ASPIR 90DEG

## (undated) DEVICE — SPONGE COTTON TRAY 4X4IN

## (undated) DEVICE — SUPPORT ULNA NERVE PROTECTOR

## (undated) DEVICE — DRAPE SURGICAL STERI IRRG PCH

## (undated) DEVICE — GLOVE SENSICARE PI MICRO 6.5

## (undated) DEVICE — BLADE SURG CARBON STEEL SZ11

## (undated) DEVICE — BURR OVAL 8 FLUTE 4MMX13CM

## (undated) DEVICE — GLOVE SENSICARE PI GRN 9

## (undated) DEVICE — KIT ARTHREX ANGEL PRP

## (undated) DEVICE — ADHESIVE DERMABOND ADVANCED

## (undated) DEVICE — DRAPE SHOULDER BEACH CHAIR

## (undated) DEVICE — HOLDER STRIP-T SELF ADH 2X10IN

## (undated) DEVICE — NDL SUREFIRE SCORPION RC

## (undated) DEVICE — NDL SYR 10ML 18X1.5 LL BLUNT

## (undated) DEVICE — SUPPORT SLING SHOT II MEDIUM

## (undated) DEVICE — BENZOIN TINCTURE CAPSULET

## (undated) DEVICE — SUT 2/0 30IN SILK BLK BRAI

## (undated) DEVICE — GLOVE SENSICARE PI MICRO 6

## (undated) DEVICE — SLEEVE LATERAL TRACTION ARM

## (undated) DEVICE — SOL IRRI STRL WATER 1000ML

## (undated) DEVICE — PAD ABDOMINAL STERILE 8X10IN

## (undated) DEVICE — DRAPE MEDI-SLUSH 44X44IN

## (undated) DEVICE — SOL NACL IRR 3000ML

## (undated) DEVICE — SUT 0 36IN PDS II VIO MONO

## (undated) DEVICE — SUT STRATAFIX 4-0 30CM PS-2

## (undated) DEVICE — GLOVE SENSICARE PI GRN 6.5

## (undated) DEVICE — NDL HYPO REG 25G X 1 1/2

## (undated) DEVICE — CANNULA LOW PROFILE 7CM 5MM

## (undated) DEVICE — CHARM MARGINMAP SPEC 5MM

## (undated) DEVICE — ILLUMINATOR PHOTONBLADE 8/11IN

## (undated) DEVICE — COVER PROBE WITH BAND 6X96IN

## (undated) DEVICE — APPLICATOR CHLORAPREP ORN 26ML

## (undated) DEVICE — DRAPE INCISE IOBAN 2 23X23IN

## (undated) DEVICE — CANNULA PASSPORT 8 MM X 4CM.

## (undated) DEVICE — TUBING PUMP ARTHROSCOPY STRL

## (undated) DEVICE — SUT MCRYL PLUS 3-0 PS2 27IN

## (undated) DEVICE — SET CASSETTE TUBE DW OUTFLOW

## (undated) DEVICE — KIT SURGICAL TURNOVER

## (undated) DEVICE — CANNULA TRIPLEDAM 6MM X 7CM

## (undated) DEVICE — DRAPE STERI U-SHAPED 47X51IN

## (undated) DEVICE — GLOVE 6.0 PROTEXIS PI MICRO

## (undated) DEVICE — SUT FIBERLINK TAPE BLU WHT 1.3

## (undated) DEVICE — GLOVE SENSICARE PI MICRO 8.5

## (undated) DEVICE — DRAPE STERI INSTRUMENT 1018

## (undated) DEVICE — COVER MAYO STAND REINFRCD 30

## (undated) DEVICE — SUT MONOCRYL 3-0 PS-2 UND

## (undated) DEVICE — NDL ANES SPINAL 18X3.5ST 18G

## (undated) DEVICE — ELECTRODE PATIENT RETURN DISP